# Patient Record
Sex: FEMALE | Race: WHITE | Employment: FULL TIME | URBAN - METROPOLITAN AREA
[De-identification: names, ages, dates, MRNs, and addresses within clinical notes are randomized per-mention and may not be internally consistent; named-entity substitution may affect disease eponyms.]

---

## 2018-05-03 ENCOUNTER — OFFICE VISIT (OUTPATIENT)
Dept: FAMILY MEDICINE CLINIC | Age: 48
End: 2018-05-03
Payer: COMMERCIAL

## 2018-05-03 VITALS
HEIGHT: 72 IN | OXYGEN SATURATION: 94 % | TEMPERATURE: 97.6 F | SYSTOLIC BLOOD PRESSURE: 122 MMHG | DIASTOLIC BLOOD PRESSURE: 88 MMHG | RESPIRATION RATE: 18 BRPM | WEIGHT: 293 LBS | HEART RATE: 68 BPM | BODY MASS INDEX: 39.68 KG/M2

## 2018-05-03 DIAGNOSIS — R63.5 WEIGHT GAIN: ICD-10-CM

## 2018-05-03 DIAGNOSIS — E66.01 MORBID OBESITY (HCC): Chronic | ICD-10-CM

## 2018-05-03 DIAGNOSIS — R60.9 PERIPHERAL EDEMA: ICD-10-CM

## 2018-05-03 DIAGNOSIS — I10 ESSENTIAL HYPERTENSION: ICD-10-CM

## 2018-05-03 DIAGNOSIS — E89.0 POSTOPERATIVE HYPOTHYROIDISM: Primary | ICD-10-CM

## 2018-05-03 DIAGNOSIS — R07.9 CHEST PAIN, UNSPECIFIED TYPE: ICD-10-CM

## 2018-05-03 DIAGNOSIS — E89.0 POSTSURGICAL HYPOTHYROIDISM: Chronic | ICD-10-CM

## 2018-05-03 DIAGNOSIS — R06.02 SOB (SHORTNESS OF BREATH): ICD-10-CM

## 2018-05-03 PROCEDURE — 99214 OFFICE O/P EST MOD 30 MIN: CPT | Performed by: PHYSICIAN ASSISTANT

## 2018-05-03 PROCEDURE — 36415 COLL VENOUS BLD VENIPUNCTURE: CPT | Performed by: PHYSICIAN ASSISTANT

## 2018-05-03 RX ORDER — LEVOTHYROXINE SODIUM 0.2 MG/1
200 TABLET ORAL DAILY
Qty: 30 TABLET | Refills: 1 | Status: SHIPPED | OUTPATIENT
Start: 2018-05-03 | End: 2018-10-18 | Stop reason: SDUPTHER

## 2018-05-03 RX ORDER — HYDROCHLOROTHIAZIDE 25 MG/1
25 TABLET ORAL DAILY
Qty: 30 TABLET | Refills: 1 | Status: SHIPPED | OUTPATIENT
Start: 2018-05-03 | End: 2018-10-17 | Stop reason: SDUPTHER

## 2018-05-03 RX ORDER — LEVOTHYROXINE SODIUM 0.03 MG/1
25 TABLET ORAL DAILY
Qty: 30 TABLET | Refills: 1 | Status: SHIPPED | OUTPATIENT
Start: 2018-05-03 | End: 2018-10-18 | Stop reason: SDUPTHER

## 2018-05-04 ENCOUNTER — OFFICE VISIT (OUTPATIENT)
Dept: FAMILY MEDICINE CLINIC | Age: 48
End: 2018-05-04
Payer: COMMERCIAL

## 2018-05-04 VITALS
TEMPERATURE: 98.6 F | OXYGEN SATURATION: 96 % | SYSTOLIC BLOOD PRESSURE: 170 MMHG | HEART RATE: 77 BPM | WEIGHT: 293 LBS | BODY MASS INDEX: 39.68 KG/M2 | HEIGHT: 72 IN | DIASTOLIC BLOOD PRESSURE: 98 MMHG

## 2018-05-04 DIAGNOSIS — J30.9 CHRONIC ALLERGIC RHINITIS, UNSPECIFIED SEASONALITY, UNSPECIFIED TRIGGER: ICD-10-CM

## 2018-05-04 DIAGNOSIS — R60.0 BILATERAL LOWER EXTREMITY EDEMA: ICD-10-CM

## 2018-05-04 DIAGNOSIS — D32.9 MENINGIOMA (HCC): Chronic | ICD-10-CM

## 2018-05-04 DIAGNOSIS — R06.02 SHORTNESS OF BREATH: ICD-10-CM

## 2018-05-04 DIAGNOSIS — I10 ESSENTIAL HYPERTENSION: Primary | ICD-10-CM

## 2018-05-04 PROCEDURE — 99214 OFFICE O/P EST MOD 30 MIN: CPT | Performed by: NURSE PRACTITIONER

## 2018-05-04 ASSESSMENT — PATIENT HEALTH QUESTIONNAIRE - PHQ9
SUM OF ALL RESPONSES TO PHQ QUESTIONS 1-9: 0
SUM OF ALL RESPONSES TO PHQ9 QUESTIONS 1 & 2: 0
2. FEELING DOWN, DEPRESSED OR HOPELESS: 0
1. LITTLE INTEREST OR PLEASURE IN DOING THINGS: 0

## 2018-05-04 ASSESSMENT — ENCOUNTER SYMPTOMS
SHORTNESS OF BREATH: 1
COUGH: 1
NAUSEA: 0
BLURRED VISION: 0
WHEEZING: 0
SINUS PAIN: 1
DIARRHEA: 0
VOMITING: 0
DOUBLE VISION: 0
CONSTIPATION: 0

## 2018-06-12 ENCOUNTER — TELEPHONE (OUTPATIENT)
Dept: FAMILY MEDICINE CLINIC | Age: 48
End: 2018-06-12

## 2018-07-27 DIAGNOSIS — R60.9 PERIPHERAL EDEMA: ICD-10-CM

## 2018-07-27 DIAGNOSIS — R63.5 WEIGHT GAIN: ICD-10-CM

## 2018-07-27 DIAGNOSIS — E89.0 POSTOPERATIVE HYPOTHYROIDISM: ICD-10-CM

## 2018-07-27 NOTE — TELEPHONE ENCOUNTER
Requested Prescriptions     Pending Prescriptions Disp Refills    levothyroxine (SYNTHROID) 200 MCG tablet 30 tablet 2     Sig: Take 1 tablet by mouth daily       Next appt is Visit date not found  Last appt was Visit date not found

## 2018-07-30 RX ORDER — LEVOTHYROXINE SODIUM 0.2 MG/1
200 TABLET ORAL DAILY
Qty: 30 TABLET | Refills: 2 | OUTPATIENT
Start: 2018-07-30

## 2018-07-30 NOTE — TELEPHONE ENCOUNTER
Pt last seen in 10/18. Was restarted on Synthroid at that time. Was to follow up for TSH in 5-6 weeks so is overdue. She needs visit and TSH draw for refill.

## 2018-10-17 ENCOUNTER — OFFICE VISIT (OUTPATIENT)
Dept: FAMILY MEDICINE CLINIC | Age: 48
End: 2018-10-17
Payer: COMMERCIAL

## 2018-10-17 ENCOUNTER — HOSPITAL ENCOUNTER (OUTPATIENT)
Age: 48
Discharge: HOME | End: 2018-10-19
Payer: COMMERCIAL

## 2018-10-17 ENCOUNTER — NURSE ONLY (OUTPATIENT)
Dept: FAMILY MEDICINE CLINIC | Age: 48
End: 2018-10-17
Payer: COMMERCIAL

## 2018-10-17 VITALS
WEIGHT: 293 LBS | HEIGHT: 72 IN | BODY MASS INDEX: 39.68 KG/M2 | TEMPERATURE: 98.4 F | OXYGEN SATURATION: 97 % | HEART RATE: 68 BPM | SYSTOLIC BLOOD PRESSURE: 138 MMHG | RESPIRATION RATE: 16 BRPM | DIASTOLIC BLOOD PRESSURE: 88 MMHG

## 2018-10-17 DIAGNOSIS — R06.02 SOB (SHORTNESS OF BREATH): ICD-10-CM

## 2018-10-17 DIAGNOSIS — I10 ESSENTIAL HYPERTENSION: ICD-10-CM

## 2018-10-17 DIAGNOSIS — J32.9 SINOBRONCHITIS: Primary | ICD-10-CM

## 2018-10-17 DIAGNOSIS — R06.02 SHORTNESS OF BREATH: ICD-10-CM

## 2018-10-17 DIAGNOSIS — J40 SINOBRONCHITIS: Primary | ICD-10-CM

## 2018-10-17 LAB — PRO-BNP: 75 PG/ML (ref 0–125)

## 2018-10-17 PROCEDURE — 83880 ASSAY OF NATRIURETIC PEPTIDE: CPT

## 2018-10-17 PROCEDURE — 1036F TOBACCO NON-USER: CPT | Performed by: PHYSICIAN ASSISTANT

## 2018-10-17 PROCEDURE — 99213 OFFICE O/P EST LOW 20 MIN: CPT | Performed by: PHYSICIAN ASSISTANT

## 2018-10-17 PROCEDURE — G8427 DOCREV CUR MEDS BY ELIG CLIN: HCPCS | Performed by: PHYSICIAN ASSISTANT

## 2018-10-17 PROCEDURE — 36415 COLL VENOUS BLD VENIPUNCTURE: CPT | Performed by: NURSE PRACTITIONER

## 2018-10-17 PROCEDURE — G8417 CALC BMI ABV UP PARAM F/U: HCPCS | Performed by: PHYSICIAN ASSISTANT

## 2018-10-17 PROCEDURE — G8484 FLU IMMUNIZE NO ADMIN: HCPCS | Performed by: PHYSICIAN ASSISTANT

## 2018-10-17 RX ORDER — BENZONATATE 200 MG/1
200 CAPSULE ORAL 3 TIMES DAILY PRN
Qty: 15 CAPSULE | Refills: 0 | Status: SHIPPED | OUTPATIENT
Start: 2018-10-17 | End: 2018-10-18 | Stop reason: SDUPTHER

## 2018-10-17 RX ORDER — DOXYCYCLINE HYCLATE 100 MG/1
100 CAPSULE ORAL 2 TIMES DAILY
Qty: 20 CAPSULE | Refills: 0 | Status: SHIPPED | OUTPATIENT
Start: 2018-10-17 | End: 2018-10-18 | Stop reason: SDUPTHER

## 2018-10-17 RX ORDER — HYDROCHLOROTHIAZIDE 25 MG/1
25 TABLET ORAL DAILY
Qty: 30 TABLET | Refills: 0 | Status: SHIPPED | OUTPATIENT
Start: 2018-10-17 | End: 2018-10-18 | Stop reason: SDUPTHER

## 2018-10-17 NOTE — PROGRESS NOTES
Chief Complaint:   Sinusitis (sinus pressure x 1 week); Cough (productive with green sputum); and Chest Congestion      History of Present Illness   Source of history provided by:  patient. Caren Harris is a 50 y.o. old female with a past medical history of:   Past Medical History:   Diagnosis Date    [facility] [address]     Headache     Heart beat abnormality     fluttering    Hypothyroidism     Meningioma ([facility] [address]     Obesity    Presents to the [facility] care for evaluation of sinus pressure, nasal congestion, discolored nasal drainage, bilateral ear pressure, productive cough, chest congestion, and sore throat x 1 week. Has been taking multiple OTC agents without relief. Denies any fever, chills, CP, SOB, or GI symptoms. Denies any hx of asthma, COPD, or tobacco use. ROS    Unless otherwise stated in this report or unable to obtain because of the patient's clinical or mental status as evidenced by the medical record, this patients's positive and negative responses for Review of Systems, constitutional, psych, eyes, ENT, cardiovascular, respiratory, gastrointestinal, neurological, genitourinary, musculoskeletal, integument systems and systems related to the presenting problem are either stated in the preceding or were not pertinent or were negative for the symptoms and/or complaints related to the medical problem. Past Surgical History:  has a past surgical history that includes brain surgery; Thyroidectomy; Gastric bypass surgery (2003); and Finger surgery. Social History:  reports that she has never smoked. She has never used smokeless tobacco. She reports that she does not drink alcohol or use drugs. Family History: family history includes Arthritis in her mother; Heart Disease in her father and mother; Obesity in her mother. She was adopted.    Allergies: Seasonal    Physical Exam         VS:  /88   Pulse 68   Temp 98.4 °F (36.9 °C) (Oral)   Resp 16   Ht 6' 1\" (1.854 m)   Wt (!) 378

## 2018-10-18 ENCOUNTER — TELEPHONE (OUTPATIENT)
Dept: FAMILY MEDICINE CLINIC | Age: 48
End: 2018-10-18

## 2018-10-18 DIAGNOSIS — E89.0 POSTOPERATIVE HYPOTHYROIDISM: ICD-10-CM

## 2018-10-18 DIAGNOSIS — R63.5 WEIGHT GAIN: ICD-10-CM

## 2018-10-18 DIAGNOSIS — R60.9 PERIPHERAL EDEMA: ICD-10-CM

## 2018-10-18 DIAGNOSIS — R06.02 SOB (SHORTNESS OF BREATH): ICD-10-CM

## 2018-10-18 DIAGNOSIS — I10 ESSENTIAL HYPERTENSION: ICD-10-CM

## 2018-10-18 RX ORDER — HYDROCHLOROTHIAZIDE 25 MG/1
25 TABLET ORAL DAILY
Qty: 30 TABLET | Refills: 0 | Status: SHIPPED | OUTPATIENT
Start: 2018-10-18 | End: 2019-10-21 | Stop reason: SDUPTHER

## 2018-10-18 RX ORDER — BENZONATATE 200 MG/1
200 CAPSULE ORAL 3 TIMES DAILY PRN
Qty: 15 CAPSULE | Refills: 0 | Status: SHIPPED | OUTPATIENT
Start: 2018-10-18 | End: 2019-10-11

## 2018-10-18 RX ORDER — DOXYCYCLINE HYCLATE 100 MG/1
100 CAPSULE ORAL 2 TIMES DAILY
Qty: 20 CAPSULE | Refills: 0 | Status: SHIPPED | OUTPATIENT
Start: 2018-10-18 | End: 2018-10-28

## 2018-10-19 ENCOUNTER — TELEPHONE (OUTPATIENT)
Dept: FAMILY MEDICINE CLINIC | Age: 48
End: 2018-10-19

## 2018-10-23 RX ORDER — LEVOTHYROXINE SODIUM 0.2 MG/1
200 TABLET ORAL DAILY
Qty: 30 TABLET | Refills: 0 | Status: SHIPPED | OUTPATIENT
Start: 2018-10-23 | End: 2019-10-21 | Stop reason: SDUPTHER

## 2018-10-23 RX ORDER — LEVOTHYROXINE SODIUM 0.03 MG/1
25 TABLET ORAL DAILY
Qty: 30 TABLET | Refills: 0 | Status: SHIPPED | OUTPATIENT
Start: 2018-10-23 | End: 2019-10-21 | Stop reason: SDUPTHER

## 2018-11-13 ENCOUNTER — OFFICE VISIT (OUTPATIENT)
Dept: FAMILY MEDICINE CLINIC | Age: 48
End: 2018-11-13
Payer: COMMERCIAL

## 2018-11-13 VITALS
BODY MASS INDEX: 39.68 KG/M2 | HEART RATE: 70 BPM | SYSTOLIC BLOOD PRESSURE: 124 MMHG | HEIGHT: 72 IN | TEMPERATURE: 98.3 F | DIASTOLIC BLOOD PRESSURE: 80 MMHG | WEIGHT: 293 LBS | RESPIRATION RATE: 16 BRPM | OXYGEN SATURATION: 97 %

## 2018-11-13 DIAGNOSIS — K11.20 SIALOADENITIS: Primary | ICD-10-CM

## 2018-11-13 PROCEDURE — 1036F TOBACCO NON-USER: CPT | Performed by: NURSE PRACTITIONER

## 2018-11-13 PROCEDURE — G8417 CALC BMI ABV UP PARAM F/U: HCPCS | Performed by: NURSE PRACTITIONER

## 2018-11-13 PROCEDURE — G8427 DOCREV CUR MEDS BY ELIG CLIN: HCPCS | Performed by: NURSE PRACTITIONER

## 2018-11-13 PROCEDURE — 99213 OFFICE O/P EST LOW 20 MIN: CPT | Performed by: NURSE PRACTITIONER

## 2018-11-13 PROCEDURE — 3017F COLORECTAL CA SCREEN DOC REV: CPT | Performed by: NURSE PRACTITIONER

## 2018-11-13 PROCEDURE — G8484 FLU IMMUNIZE NO ADMIN: HCPCS | Performed by: NURSE PRACTITIONER

## 2018-11-13 RX ORDER — CEPHALEXIN 500 MG/1
CAPSULE ORAL
Qty: 40 CAPSULE | Refills: 0 | Status: SHIPPED | OUTPATIENT
Start: 2018-11-13 | End: 2020-01-15 | Stop reason: ALTCHOICE

## 2018-11-13 RX ORDER — TRIAMCINOLONE ACETONIDE 40 MG/ML
60 INJECTION, SUSPENSION INTRA-ARTICULAR; INTRAMUSCULAR ONCE
Status: COMPLETED | OUTPATIENT
Start: 2018-11-13 | End: 2018-11-13

## 2018-11-13 RX ORDER — PREDNISONE 10 MG/1
TABLET ORAL
Qty: 6 TABLET | Refills: 0 | Status: SHIPPED | OUTPATIENT
Start: 2018-11-13 | End: 2020-01-15 | Stop reason: ALTCHOICE

## 2018-11-13 RX ADMIN — TRIAMCINOLONE ACETONIDE 60 MG: 40 INJECTION, SUSPENSION INTRA-ARTICULAR; INTRAMUSCULAR at 17:21

## 2018-11-13 ASSESSMENT — ENCOUNTER SYMPTOMS
SINUS PRESSURE: 0
EYE ITCHING: 0
NAUSEA: 0
APNEA: 0
VOMITING: 0
SORE THROAT: 0
PHOTOPHOBIA: 0
VOICE CHANGE: 0
CHEST TIGHTNESS: 0
SHORTNESS OF BREATH: 0
COUGH: 0
EYE DISCHARGE: 0
STRIDOR: 0
RHINORRHEA: 0
DIARRHEA: 0
EYE PAIN: 0
COLOR CHANGE: 0
TROUBLE SWALLOWING: 1
CHOKING: 0
SINUS PAIN: 0
ABDOMINAL PAIN: 0
EYE REDNESS: 0
WHEEZING: 0

## 2018-11-13 NOTE — PROGRESS NOTES
home exercises printed for patient's review and were included in patient instructions on his/her After Visit Summary and given to patient at the end of visit. Counseled regarding above diagnosis, including possible risks and complications,  especially if left uncontrolled. Counseled regarding the possible side effects, risks, benefits and alternatives to treatment; patient and/or guardian verbalizes understanding, agrees, feels comfortable with and wishes to proceed with above treatment plan. Advised patient to call with any new medication issues, and read all Rx info from pharmacy to assure aware of all possible risks and side effects of medication before taking. Reviewed age and gender appropriate health screening exams and vaccinations. Advised patient regarding importance of keeping up with recommended health maintenance and to schedule as soon as possible if overdue, as this is important in assessing for undiagnosed pathology, especially cancer, as well as protecting against potentially harmful/life threatening disease. Patient and/or guardian verbalizes understanding and agrees with above counseling, assessment and plan. All questions answered.     Natalie Yuan, APRN - CNP

## 2019-10-11 ENCOUNTER — OFFICE VISIT (OUTPATIENT)
Dept: FAMILY MEDICINE CLINIC | Age: 49
End: 2019-10-11
Payer: COMMERCIAL

## 2019-10-11 VITALS
TEMPERATURE: 98.5 F | HEIGHT: 72 IN | SYSTOLIC BLOOD PRESSURE: 138 MMHG | RESPIRATION RATE: 20 BRPM | OXYGEN SATURATION: 95 % | BODY MASS INDEX: 39.68 KG/M2 | DIASTOLIC BLOOD PRESSURE: 88 MMHG | HEART RATE: 95 BPM | WEIGHT: 293 LBS

## 2019-10-11 DIAGNOSIS — J40 SINOBRONCHITIS: Primary | ICD-10-CM

## 2019-10-11 DIAGNOSIS — J32.9 SINOBRONCHITIS: Primary | ICD-10-CM

## 2019-10-11 PROCEDURE — 99213 OFFICE O/P EST LOW 20 MIN: CPT | Performed by: PHYSICIAN ASSISTANT

## 2019-10-11 PROCEDURE — 3017F COLORECTAL CA SCREEN DOC REV: CPT | Performed by: PHYSICIAN ASSISTANT

## 2019-10-11 PROCEDURE — 1036F TOBACCO NON-USER: CPT | Performed by: PHYSICIAN ASSISTANT

## 2019-10-11 PROCEDURE — G8427 DOCREV CUR MEDS BY ELIG CLIN: HCPCS | Performed by: PHYSICIAN ASSISTANT

## 2019-10-11 PROCEDURE — G8484 FLU IMMUNIZE NO ADMIN: HCPCS | Performed by: PHYSICIAN ASSISTANT

## 2019-10-11 PROCEDURE — G8417 CALC BMI ABV UP PARAM F/U: HCPCS | Performed by: PHYSICIAN ASSISTANT

## 2019-10-11 RX ORDER — ALBUTEROL SULFATE 90 UG/1
2 AEROSOL, METERED RESPIRATORY (INHALATION)
Qty: 1 INHALER | Refills: 0 | Status: SHIPPED | OUTPATIENT
Start: 2019-10-11 | End: 2020-01-15 | Stop reason: ALTCHOICE

## 2019-10-11 RX ORDER — BENZONATATE 200 MG/1
200 CAPSULE ORAL 3 TIMES DAILY PRN
Qty: 15 CAPSULE | Refills: 0 | Status: SHIPPED | OUTPATIENT
Start: 2019-10-11 | End: 2020-01-15 | Stop reason: ALTCHOICE

## 2019-10-11 RX ORDER — DOXYCYCLINE HYCLATE 100 MG/1
100 CAPSULE ORAL 2 TIMES DAILY
Qty: 20 CAPSULE | Refills: 0 | Status: SHIPPED | OUTPATIENT
Start: 2019-10-11 | End: 2019-10-21

## 2019-10-16 ENCOUNTER — TELEPHONE (OUTPATIENT)
Dept: ADMINISTRATIVE | Age: 49
End: 2019-10-16

## 2019-10-16 NOTE — TELEPHONE ENCOUNTER
Pt called to cancel appt for today with Coy Ovalle, pt got called to work at the [employer] at 4 am this morning. Pt tried to call and cancel before office hours, she apologized for missed appt, wanted to r/s. Appointment is scheduled for 10/21 with Coy Ovalle.

## 2019-10-21 ENCOUNTER — OFFICE VISIT (OUTPATIENT)
Dept: FAMILY MEDICINE CLINIC | Age: 49
End: 2019-10-21
Payer: COMMERCIAL

## 2019-10-21 VITALS
DIASTOLIC BLOOD PRESSURE: 100 MMHG | BODY MASS INDEX: 39.68 KG/M2 | OXYGEN SATURATION: 96 % | TEMPERATURE: 97.8 F | HEIGHT: 72 IN | SYSTOLIC BLOOD PRESSURE: 140 MMHG | WEIGHT: 293 LBS | HEART RATE: 59 BPM

## 2019-10-21 DIAGNOSIS — I10 ESSENTIAL HYPERTENSION: ICD-10-CM

## 2019-10-21 DIAGNOSIS — E89.0 POSTOPERATIVE HYPOTHYROIDISM: Primary | ICD-10-CM

## 2019-10-21 PROBLEM — R06.02 SOB (SHORTNESS OF BREATH): Status: ACTIVE | Noted: 2019-10-21

## 2019-10-21 PROCEDURE — G8417 CALC BMI ABV UP PARAM F/U: HCPCS | Performed by: NURSE PRACTITIONER

## 2019-10-21 PROCEDURE — G8482 FLU IMMUNIZE ORDER/ADMIN: HCPCS | Performed by: NURSE PRACTITIONER

## 2019-10-21 PROCEDURE — G8427 DOCREV CUR MEDS BY ELIG CLIN: HCPCS | Performed by: NURSE PRACTITIONER

## 2019-10-21 PROCEDURE — 99213 OFFICE O/P EST LOW 20 MIN: CPT | Performed by: NURSE PRACTITIONER

## 2019-10-21 PROCEDURE — 1036F TOBACCO NON-USER: CPT | Performed by: NURSE PRACTITIONER

## 2019-10-21 PROCEDURE — 3017F COLORECTAL CA SCREEN DOC REV: CPT | Performed by: NURSE PRACTITIONER

## 2019-10-21 RX ORDER — LEVOTHYROXINE SODIUM 0.03 MG/1
25 TABLET ORAL DAILY
Qty: 30 TABLET | Refills: 0 | Status: SHIPPED
Start: 2019-10-21 | End: 2020-03-05 | Stop reason: SDUPTHER

## 2019-10-21 RX ORDER — HYDROCHLOROTHIAZIDE 25 MG/1
25 TABLET ORAL DAILY
Qty: 30 TABLET | Refills: 0 | Status: SHIPPED
Start: 2019-10-21 | End: 2020-03-06 | Stop reason: SDUPTHER

## 2019-10-21 RX ORDER — TRAZODONE HYDROCHLORIDE 50 MG/1
50 TABLET ORAL NIGHTLY
Qty: 30 TABLET | Refills: 0 | Status: ON HOLD | OUTPATIENT
Start: 2019-10-21 | End: 2021-02-03 | Stop reason: ALTCHOICE

## 2019-10-21 RX ORDER — LEVOTHYROXINE SODIUM 0.2 MG/1
200 TABLET ORAL DAILY
Qty: 30 TABLET | Refills: 0 | Status: SHIPPED
Start: 2019-10-21 | End: 2020-03-05 | Stop reason: SDUPTHER

## 2019-10-21 ASSESSMENT — ENCOUNTER SYMPTOMS
COUGH: 1
CHOKING: 1
SINUS PAIN: 1
CONSTIPATION: 1
ALLERGIC/IMMUNOLOGIC NEGATIVE: 1
EYES NEGATIVE: 1
SINUS PRESSURE: 1

## 2019-10-21 ASSESSMENT — PATIENT HEALTH QUESTIONNAIRE - PHQ9
SUM OF ALL RESPONSES TO PHQ QUESTIONS 1-9: 0
2. FEELING DOWN, DEPRESSED OR HOPELESS: 0
SUM OF ALL RESPONSES TO PHQ QUESTIONS 1-9: 0
SUM OF ALL RESPONSES TO PHQ9 QUESTIONS 1 & 2: 0
1. LITTLE INTEREST OR PLEASURE IN DOING THINGS: 0

## 2019-10-22 PROCEDURE — 90686 IIV4 VACC NO PRSV 0.5 ML IM: CPT | Performed by: NURSE PRACTITIONER

## 2019-10-22 PROCEDURE — 90471 IMMUNIZATION ADMIN: CPT | Performed by: NURSE PRACTITIONER

## 2020-01-15 ENCOUNTER — OFFICE VISIT (OUTPATIENT)
Dept: FAMILY MEDICINE CLINIC | Age: 50
End: 2020-01-15
Payer: COMMERCIAL

## 2020-01-15 VITALS
WEIGHT: 293 LBS | HEART RATE: 70 BPM | RESPIRATION RATE: 24 BRPM | SYSTOLIC BLOOD PRESSURE: 134 MMHG | BODY MASS INDEX: 39.68 KG/M2 | DIASTOLIC BLOOD PRESSURE: 88 MMHG | OXYGEN SATURATION: 97 % | TEMPERATURE: 98.3 F | HEIGHT: 72 IN

## 2020-01-15 PROCEDURE — 3017F COLORECTAL CA SCREEN DOC REV: CPT | Performed by: NURSE PRACTITIONER

## 2020-01-15 PROCEDURE — 1036F TOBACCO NON-USER: CPT | Performed by: NURSE PRACTITIONER

## 2020-01-15 PROCEDURE — G8482 FLU IMMUNIZE ORDER/ADMIN: HCPCS | Performed by: NURSE PRACTITIONER

## 2020-01-15 PROCEDURE — 99213 OFFICE O/P EST LOW 20 MIN: CPT | Performed by: NURSE PRACTITIONER

## 2020-01-15 PROCEDURE — G8427 DOCREV CUR MEDS BY ELIG CLIN: HCPCS | Performed by: NURSE PRACTITIONER

## 2020-01-15 PROCEDURE — G8417 CALC BMI ABV UP PARAM F/U: HCPCS | Performed by: NURSE PRACTITIONER

## 2020-01-15 RX ORDER — HYDROCODONE BITARTRATE AND ACETAMINOPHEN 5; 325 MG/1; MG/1
1 TABLET ORAL EVERY 6 HOURS PRN
Qty: 8 TABLET | Refills: 0 | Status: SHIPPED | OUTPATIENT
Start: 2020-01-15 | End: 2020-01-17

## 2020-01-15 RX ORDER — CEPHALEXIN 500 MG/1
500 CAPSULE ORAL 4 TIMES DAILY
Qty: 28 CAPSULE | Refills: 0 | Status: SHIPPED | OUTPATIENT
Start: 2020-01-15 | End: 2020-01-22

## 2020-01-15 ASSESSMENT — ENCOUNTER SYMPTOMS
GASTROINTESTINAL NEGATIVE: 1
RESPIRATORY NEGATIVE: 1
EYES NEGATIVE: 1
ALLERGIC/IMMUNOLOGIC NEGATIVE: 1

## 2020-01-15 NOTE — PROGRESS NOTES
Jennifer Vergara  is a 44 y.o. female  who presents today for    Chief Complaint   Patient presents with    Foot Injury     right foot 4th toe, ripped toenail, keeps bleeding       HPI:  Pt presents to walk in with injury to right 4th distal toe with partial nail avulsion. States last night a gallon water jug fell off the counter landing directly onto her toe. Had immediate pain and swelling with injury to nail plate. Applied ice and soaking in Epsom Salts. No relief with ibuprofen tylenol. [address]:  Patient's past medical, surgical, social and/or family history reviewed, updated in chart, and are non-contributory (unless otherwise stated). Medications and allergies also reviewed and updated in chart. Review of Systems   Constitutional: Negative. HENT: Negative. Eyes: Negative. Respiratory: Negative. Cardiovascular: Negative. Gastrointestinal: Negative. Endocrine: Negative. Genitourinary: Negative. Musculoskeletal: Negative. Pain 4th digit right foot   Skin: Negative. Allergic/Immunologic: Negative. Hematological: Negative. Physical Exam:  /88   Pulse 70   Temp 98.3 °F (36.8 °C) (Oral)   Resp 24   Ht 6' 1\" (1.854 m)   Wt (!) 362 lb (164.2 kg)   SpO2 97%   BMI 47.76 kg/m²   Wt Readings from Last 3 Encounters:   01/15/20 (!) 362 lb (164.2 kg)   10/21/19 (!) 362 lb (164.2 kg)   10/11/19 (!) 364 lb (165.1 kg)         Physical Exam  Constitutional:       Appearance: She is well-developed. HENT:      Head: Normocephalic. Right Ear: External ear normal.      Left Ear: External ear normal.      Nose: Nose normal.   Eyes:      Conjunctiva/sclera: Conjunctivae normal.      Pupils: Pupils are equal, round, and reactive to light. Neck:      Musculoskeletal: Normal range of motion. Cardiovascular:      Rate and Rhythm: Normal rate and regular rhythm. Heart sounds: Normal heart sounds.    Pulmonary:      Effort: Pulmonary effort is normal. Advised patient to call with any new medication issues, and read all Rx info from pharmacy to assure aware of all possible risks and side effects of medication before taking. Reviewed age and gender appropriate health screening exams and vaccinations. Advised patient regarding importance of keeping up with recommended health maintenance and to schedule as soon as possible if overdue, as this is important in assessing for undiagnosed pathology, especially cancer, as well as protecting against potentially harmful/life threatening disease. Patient and/or guardian verbalizes understanding and agrees with above counseling, assessment and plan. All questions answered.     Nori Yeboah - CNP

## 2020-03-04 ENCOUNTER — NURSE ONLY (OUTPATIENT)
Dept: FAMILY MEDICINE CLINIC | Age: 50
End: 2020-03-04

## 2020-03-04 ENCOUNTER — HOSPITAL ENCOUNTER (OUTPATIENT)
Age: 50
Discharge: HOME | End: 2020-03-06
Payer: COMMERCIAL

## 2020-03-04 LAB
ALBUMIN SERPL-MCNC: 4 G/DL (ref 3.5–5.2)
ALP BLD-CCNC: 77 U/L (ref 35–104)
ALT SERPL-CCNC: 19 U/L (ref 0–32)
ANION GAP SERPL CALCULATED.3IONS-SCNC: 15 MMOL/L (ref 7–16)
AST SERPL-CCNC: 19 U/L (ref 0–31)
BILIRUB SERPL-MCNC: 0.7 MG/DL (ref 0–1.2)
BUN BLDV-MCNC: 15 MG/DL (ref 6–20)
CALCIUM SERPL-MCNC: 9.5 MG/DL (ref 8.6–10.2)
CHLORIDE BLD-SCNC: 103 MMOL/L (ref 98–107)
CHOLESTEROL, TOTAL: 207 MG/DL (ref 0–199)
CO2: 24 MMOL/L (ref 22–29)
CREAT SERPL-MCNC: 0.8 MG/DL (ref 0.5–1)
GFR AFRICAN AMERICAN: >60
GFR NON-AFRICAN AMERICAN: >60 ML/MIN/1.73
GLUCOSE BLD-MCNC: 115 MG/DL (ref 74–99)
HDLC SERPL-MCNC: 70 MG/DL
LDL CHOLESTEROL CALCULATED: 113 MG/DL (ref 0–99)
POTASSIUM SERPL-SCNC: 4.8 MMOL/L (ref 3.5–5)
SODIUM BLD-SCNC: 142 MMOL/L (ref 132–146)
TOTAL PROTEIN: 7.1 G/DL (ref 6.4–8.3)
TRIGL SERPL-MCNC: 118 MG/DL (ref 0–149)
TSH SERPL DL<=0.05 MIU/L-ACNC: 18.89 UIU/ML (ref 0.27–4.2)
VLDLC SERPL CALC-MCNC: 24 MG/DL

## 2020-03-04 PROCEDURE — 80061 LIPID PANEL: CPT

## 2020-03-04 PROCEDURE — 80053 COMPREHEN METABOLIC PANEL: CPT

## 2020-03-04 PROCEDURE — 84443 ASSAY THYROID STIM HORMONE: CPT

## 2020-03-04 RX ORDER — LEVOTHYROXINE SODIUM 0.03 MG/1
25 TABLET ORAL DAILY
Qty: 30 TABLET | Refills: 0 | Status: CANCELLED | OUTPATIENT
Start: 2020-03-04

## 2020-03-04 RX ORDER — LEVOTHYROXINE SODIUM 0.2 MG/1
200 TABLET ORAL DAILY
Qty: 30 TABLET | Refills: 0 | Status: CANCELLED | OUTPATIENT
Start: 2020-03-04

## 2020-03-05 RX ORDER — LEVOTHYROXINE SODIUM 0.2 MG/1
200 TABLET ORAL DAILY
Qty: 30 TABLET | Refills: 0 | Status: SHIPPED
Start: 2020-03-05 | End: 2020-03-06

## 2020-03-05 RX ORDER — LEVOTHYROXINE SODIUM 0.03 MG/1
25 TABLET ORAL DAILY
Qty: 30 TABLET | Refills: 0 | Status: SHIPPED
Start: 2020-03-05 | End: 2020-03-06

## 2020-03-06 ENCOUNTER — OFFICE VISIT (OUTPATIENT)
Dept: FAMILY MEDICINE CLINIC | Age: 50
End: 2020-03-06
Payer: COMMERCIAL

## 2020-03-06 VITALS
HEIGHT: 72 IN | SYSTOLIC BLOOD PRESSURE: 128 MMHG | DIASTOLIC BLOOD PRESSURE: 86 MMHG | WEIGHT: 293 LBS | OXYGEN SATURATION: 96 % | TEMPERATURE: 98.1 F | HEART RATE: 77 BPM | BODY MASS INDEX: 39.68 KG/M2

## 2020-03-06 PROCEDURE — 99204 OFFICE O/P NEW MOD 45 MIN: CPT | Performed by: FAMILY MEDICINE

## 2020-03-06 PROCEDURE — G8427 DOCREV CUR MEDS BY ELIG CLIN: HCPCS | Performed by: FAMILY MEDICINE

## 2020-03-06 PROCEDURE — G8417 CALC BMI ABV UP PARAM F/U: HCPCS | Performed by: FAMILY MEDICINE

## 2020-03-06 PROCEDURE — G8482 FLU IMMUNIZE ORDER/ADMIN: HCPCS | Performed by: FAMILY MEDICINE

## 2020-03-06 PROCEDURE — 1036F TOBACCO NON-USER: CPT | Performed by: FAMILY MEDICINE

## 2020-03-06 PROCEDURE — 3017F COLORECTAL CA SCREEN DOC REV: CPT | Performed by: FAMILY MEDICINE

## 2020-03-06 RX ORDER — HYDROCHLOROTHIAZIDE 25 MG/1
25 TABLET ORAL DAILY
Qty: 30 TABLET | Refills: 5 | Status: SHIPPED
Start: 2020-03-06 | End: 2021-07-26 | Stop reason: ALTCHOICE

## 2020-03-06 RX ORDER — LEVOTHYROXINE SODIUM 0.03 MG/1
25 TABLET ORAL DAILY
Qty: 30 TABLET | Refills: 0 | Status: CANCELLED | OUTPATIENT
Start: 2020-03-06

## 2020-03-06 RX ORDER — LEVOTHYROXINE SODIUM 25 MCG
25 TABLET ORAL DAILY
Qty: 30 TABLET | Refills: 5 | Status: SHIPPED
Start: 2020-03-06 | End: 2020-12-29 | Stop reason: SDUPTHER

## 2020-03-06 RX ORDER — LEVOTHYROXINE SODIUM 200 MCG
200 TABLET ORAL DAILY
Qty: 30 TABLET | Refills: 5 | Status: SHIPPED
Start: 2020-03-06 | End: 2020-12-29 | Stop reason: SDUPTHER

## 2020-03-06 RX ORDER — LEVOTHYROXINE SODIUM 0.2 MG/1
200 TABLET ORAL DAILY
Qty: 30 TABLET | Refills: 0 | Status: CANCELLED | OUTPATIENT
Start: 2020-03-06

## 2020-03-06 ASSESSMENT — ENCOUNTER SYMPTOMS
COUGH: 0
SORE THROAT: 0
NAUSEA: 0
ABDOMINAL PAIN: 0
WHEEZING: 0
VOMITING: 0
SHORTNESS OF BREATH: 0
BACK PAIN: 0
DIARRHEA: 0
BLOOD IN STOOL: 0

## 2020-03-06 ASSESSMENT — PATIENT HEALTH QUESTIONNAIRE - PHQ9
2. FEELING DOWN, DEPRESSED OR HOPELESS: 1
SUM OF ALL RESPONSES TO PHQ QUESTIONS 1-9: 2
SUM OF ALL RESPONSES TO PHQ9 QUESTIONS 1 & 2: 2
1. LITTLE INTEREST OR PLEASURE IN DOING THINGS: 1
SUM OF ALL RESPONSES TO PHQ QUESTIONS 1-9: 2

## 2020-03-06 NOTE — PROGRESS NOTES
protecting against potentially harmful/life threatening disease. Patient and/or guardian verbalizes understandingand agrees with above counseling, assessment and plan. All questions answered.     Ney Adams MD

## 2020-03-07 ASSESSMENT — ENCOUNTER SYMPTOMS: CONSTIPATION: 1

## 2020-07-23 ENCOUNTER — HOSPITAL ENCOUNTER (EMERGENCY)
Age: 50
Discharge: HOME | End: 2020-07-23
Payer: COMMERCIAL

## 2020-07-23 VITALS
SYSTOLIC BLOOD PRESSURE: 162 MMHG | DIASTOLIC BLOOD PRESSURE: 104 MMHG | BODY MASS INDEX: 48.68 KG/M2 | OXYGEN SATURATION: 95 % | TEMPERATURE: 97.6 F | RESPIRATION RATE: 16 BRPM | HEART RATE: 79 BPM | WEIGHT: 293 LBS

## 2020-07-23 PROCEDURE — 99212 OFFICE O/P EST SF 10 MIN: CPT

## 2020-07-23 PROCEDURE — 6360000002 HC RX W HCPCS: Performed by: PHYSICIAN ASSISTANT

## 2020-07-23 RX ORDER — AMOXICILLIN 875 MG/1
875 TABLET, COATED ORAL 2 TIMES DAILY
Qty: 20 TABLET | Refills: 0 | Status: SHIPPED | OUTPATIENT
Start: 2020-07-23 | End: 2020-08-02

## 2020-07-23 RX ORDER — DEXAMETHASONE SODIUM PHOSPHATE 10 MG/ML
10 INJECTION, SOLUTION INTRAMUSCULAR; INTRAVENOUS ONCE
Status: COMPLETED | OUTPATIENT
Start: 2020-07-23 | End: 2020-07-23

## 2020-07-23 RX ADMIN — DEXAMETHASONE SODIUM PHOSPHATE 10 MG: 10 INJECTION, SOLUTION INTRAMUSCULAR; INTRAVENOUS at 17:59

## 2020-07-23 NOTE — ED PROVIDER NOTES
This is a 57-year-old female that presents to urgent care complaining of a right-sided sore throat and sore mouth for the past day or so. Does have a history of strep throat in the past.  Complains of some right side anterior neck discomfort as well but no difficulty breathing or swallowing. No abdominal pain nausea vomiting diarrhea or urinary symptoms on first contact patient she appears to be in no acute distress. Review of Systems   Constitutional:        Pertinent positives and negatives are stated within HPI, all other systems reviewed and are negative. Physical Exam  Vitals signs and nursing note reviewed. Constitutional:       Appearance: She is well-developed. HENT:      Head: Normocephalic and atraumatic. Jaw: There is normal jaw occlusion. No trismus. Right Ear: Hearing, tympanic membrane, ear canal and external ear normal.      Left Ear: Hearing, tympanic membrane, ear canal and external ear normal.      Nose: Nose normal.      Right Sinus: No maxillary sinus tenderness or frontal sinus tenderness. Left Sinus: No maxillary sinus tenderness or frontal sinus tenderness. Mouth/Throat:      Dentition: Normal dentition. No dental abscesses. Pharynx: Uvula midline. Pharyngeal swelling, oropharyngeal exudate and posterior oropharyngeal erythema present. No uvula swelling. Tonsils: No tonsillar abscesses. Comments: Oropharynx is patent. Right oropharynx did see a small exudate. There  Eyes:      General: Lids are normal.      Conjunctiva/sclera: Conjunctivae normal.      Pupils: Pupils are equal, round, and reactive to light. Neck:      Musculoskeletal: Full passive range of motion without pain, normal range of motion and neck supple. No edema, erythema, crepitus, pain with movement or spinous process tenderness. Cardiovascular:      Rate and Rhythm: Normal rate and regular rhythm. Heart sounds: Normal heart sounds. No murmur.    Pulmonary: Effort: Pulmonary effort is normal.      Breath sounds: Normal breath sounds. Abdominal:      General: Bowel sounds are normal.      Palpations: Abdomen is soft. Abdomen is not rigid. Tenderness: There is no abdominal tenderness. There is no guarding or rebound. Lymphadenopathy:      Comments: No swollen lymph nodes palpated but does have some tender right-sided lymph nodes anteriorly. Skin:     General: Skin is warm and dry. Findings: No abrasion or rash. Neurological:      Mental Status: She is alert and oriented to person, place, and time. GCS: GCS eye subscore is 4. GCS verbal subscore is 5. GCS motor subscore is 6. Cranial Nerves: No cranial nerve deficit. Sensory: No sensory deficit. Coordination: Coordination normal.      Gait: Gait normal.         Procedures    MDM       --------------------------------------------- PAST HISTORY ---------------------------------------------  Past Medical History:  has a past medical history of Brain tumor ([facility] [address] Headache, Heart beat abnormality, Hypothyroidism, Meningioma ([facility] [address] and Obesity. Past Surgical History:  has a past surgical history that includes brain surgery; Thyroidectomy; Gastric bypass surgery (2003); and Finger surgery. Social History:  reports that she has never smoked. She has never used smokeless tobacco. She reports that she does not drink alcohol or use drugs. Family History: family history includes Arthritis in her mother; Heart Disease in her father and mother; Obesity in her mother. She was adopted. The patients home medications have been reviewed. Allergies: Seasonal    -------------------------------------------------- RESULTS -------------------------------------------------  No results found for this visit on 07/23/20.   No orders to display       ------------------------- NURSING NOTES AND VITALS REVIEWED ---------------------------   The nursing notes within the ED encounter and vital signs as below have been reviewed. BP (!) 162/104   Pulse 79   Temp 97.6 °F (36.4 °C) (Infrared)   Resp 16   Wt (!) 369 lb (167.4 kg)   SpO2 95%   BMI 48.68 kg/m²   Oxygen Saturation Interpretation: Normal      ------------------------------------------ PROGRESS NOTES ------------------------------------------   I have spoken with the patient and discussed todays results, in addition to providing specific details for the plan of care and counseling regarding the diagnosis and prognosis. Their questions are answered at this time and they are agreeable with the plan.      --------------------------------- ADDITIONAL PROVIDER NOTES ---------------------------------     This patient is stable for discharge. I have shared the specific conditions for return, as well as the importance of follow-up. * NOTE: This report was transcribed using voice recognition software. Every effort was made to ensure accuracy; however, inadvertent computerized transcription errors may be present.    --------------------------------- IMPRESSION AND DISPOSITION ---------------------------------    IMPRESSION  1.  Exudative pharyngitis        DISPOSITION  Disposition: Discharge to home  Patient condition is good         Almaz Pike PA-C  07/23/20 [id number]

## 2020-07-24 ENCOUNTER — CARE COORDINATION (OUTPATIENT)
Dept: CARE COORDINATION | Age: 50
End: 2020-07-24

## 2020-07-25 NOTE — CARE COORDINATION
LSW made a final call attempt to Payton Porter for the COVID-19 follow up calls. There was no answer;  Final call VM left  Episode was resolved and LSW removed from the care team.

## 2020-09-24 ENCOUNTER — TELEPHONE (OUTPATIENT)
Dept: FAMILY MEDICINE CLINIC | Age: 50
End: 2020-09-24

## 2020-09-28 ENCOUNTER — TELEPHONE (OUTPATIENT)
Dept: FAMILY MEDICINE CLINIC | Age: 50
End: 2020-09-28

## 2020-09-28 NOTE — TELEPHONE ENCOUNTER
Patient insurance has denied both synthroid mediations. A peer to peer can be made or the medication needs to be changed to the levothyroxine for insurance to pay for the medications.

## 2020-09-28 NOTE — TELEPHONE ENCOUNTER
Chart reviewed. She is overdue for a visit with labs drawn before.     Recommended scheduling visit (either in person or virtual) with labs done at least 1-2 days before to review labs so that appropriate refill dosing cane be provided

## 2020-10-02 ENCOUNTER — HOSPITAL ENCOUNTER (OUTPATIENT)
Age: 50
Discharge: HOME | End: 2020-10-04
Payer: COMMERCIAL

## 2020-10-02 LAB
ALBUMIN SERPL-MCNC: 4 G/DL (ref 3.5–5.2)
ALP BLD-CCNC: 83 U/L (ref 35–104)
ALT SERPL-CCNC: 24 U/L (ref 0–32)
ANION GAP SERPL CALCULATED.3IONS-SCNC: 14 MMOL/L (ref 7–16)
AST SERPL-CCNC: 24 U/L (ref 0–31)
BILIRUB SERPL-MCNC: 1 MG/DL (ref 0–1.2)
BUN BLDV-MCNC: 16 MG/DL (ref 6–20)
CALCIUM SERPL-MCNC: 9.5 MG/DL (ref 8.6–10.2)
CHLORIDE BLD-SCNC: 100 MMOL/L (ref 98–107)
CHOLESTEROL, TOTAL: 175 MG/DL (ref 0–199)
CO2: 26 MMOL/L (ref 22–29)
CREAT SERPL-MCNC: 0.7 MG/DL (ref 0.5–1)
GFR AFRICAN AMERICAN: >60
GFR NON-AFRICAN AMERICAN: >60 ML/MIN/1.73
GLUCOSE BLD-MCNC: 107 MG/DL (ref 74–99)
HBA1C MFR BLD: 5.9 % (ref 4–5.6)
HDLC SERPL-MCNC: 65 MG/DL
LDL CHOLESTEROL CALCULATED: 83 MG/DL (ref 0–99)
POTASSIUM SERPL-SCNC: 3.9 MMOL/L (ref 3.5–5)
SODIUM BLD-SCNC: 140 MMOL/L (ref 132–146)
T4 TOTAL: 5.1 MCG/DL (ref 4.5–11.7)
TOTAL PROTEIN: 7.1 G/DL (ref 6.4–8.3)
TRIGL SERPL-MCNC: 133 MG/DL (ref 0–149)
TSH SERPL DL<=0.05 MIU/L-ACNC: 10.09 UIU/ML (ref 0.27–4.2)
VLDLC SERPL CALC-MCNC: 27 MG/DL

## 2020-10-02 PROCEDURE — 84436 ASSAY OF TOTAL THYROXINE: CPT

## 2020-10-02 PROCEDURE — 84443 ASSAY THYROID STIM HORMONE: CPT

## 2020-10-02 PROCEDURE — 80053 COMPREHEN METABOLIC PANEL: CPT

## 2020-10-02 PROCEDURE — 36415 COLL VENOUS BLD VENIPUNCTURE: CPT

## 2020-10-02 PROCEDURE — 80061 LIPID PANEL: CPT

## 2020-10-02 PROCEDURE — 83036 HEMOGLOBIN GLYCOSYLATED A1C: CPT

## 2020-10-30 ENCOUNTER — HOSPITAL ENCOUNTER (OUTPATIENT)
Age: 50
Setting detail: SPECIMEN
Discharge: HOME | End: 2020-10-30
Payer: COMMERCIAL

## 2020-12-28 RX ORDER — LEVOTHYROXINE SODIUM 25 MCG
25 TABLET ORAL DAILY
Qty: 30 TABLET | Refills: 5 | OUTPATIENT
Start: 2020-12-28

## 2020-12-28 RX ORDER — LEVOTHYROXINE SODIUM 200 MCG
200 TABLET ORAL DAILY
Qty: 30 TABLET | Refills: 5 | OUTPATIENT
Start: 2020-12-28

## 2020-12-28 RX ORDER — HYDROCHLOROTHIAZIDE 25 MG/1
25 TABLET ORAL DAILY
Qty: 30 TABLET | Refills: 5 | OUTPATIENT
Start: 2020-12-28

## 2020-12-29 RX ORDER — LEVOTHYROXINE SODIUM 200 MCG
200 TABLET ORAL DAILY
Qty: 7 TABLET | Refills: 0 | Status: CANCELLED | OUTPATIENT
Start: 2020-12-29

## 2020-12-29 RX ORDER — LEVOTHYROXINE SODIUM 200 MCG
200 TABLET ORAL DAILY
Qty: 7 TABLET | Refills: 0 | Status: SHIPPED
Start: 2020-12-29 | End: 2021-07-26

## 2020-12-29 RX ORDER — LEVOTHYROXINE SODIUM 25 MCG
25 TABLET ORAL DAILY
Qty: 7 TABLET | Refills: 0 | Status: CANCELLED | OUTPATIENT
Start: 2020-12-29

## 2020-12-29 RX ORDER — LEVOTHYROXINE SODIUM 25 MCG
25 TABLET ORAL DAILY
Qty: 7 TABLET | Refills: 0 | Status: SHIPPED
Start: 2020-12-29 | End: 2021-07-26

## 2020-12-29 RX ORDER — HYDROCHLOROTHIAZIDE 25 MG/1
25 TABLET ORAL DAILY
Qty: 7 TABLET | Refills: 0 | OUTPATIENT
Start: 2020-12-29

## 2020-12-29 NOTE — TELEPHONE ENCOUNTER
Patient called and was informed needs appt. VV made 1/5/21. Patient stated she is out of RX. Informed will send msg for short term refill, till patient has appt.       Last seen Visit date not found  Next appt 1/5/2021  Zeenat

## 2020-12-29 NOTE — TELEPHONE ENCOUNTER
Please notify patient that a 7 day supply was sent to [facility].     She should also have labs drawn prior to visit

## 2020-12-31 NOTE — PROGRESS NOTES
21    TELEHEALTH EVALUATION -- Audio/Visual (During GXEFI-87 public health emergency)    Milind Padilla is a 44 y.o. female being evaluated by a Virtual Visit (video visit) encounter to address concerns as mentioned above. A caregiver was present when appropriate. Due to this being a TeleHealth encounter (During PRGLX-50 public health emergency), evaluation of the following organ systems was limited: Vitals/Constitutional/EENT/Resp/CV/GI//MS/Neuro/Skin/Heme-Lymph-Imm. Pursuant to the emergency declaration under the [address] [place] [address], [address] [place] [address] authority and the [employer] Act, this Virtual Visit was conducted with patient's (and/or legal guardian's) consent, to reduce the patient's risk of exposure to COVID-19 and provide necessary medical care. The patient (and/or legal guardian) has also been advised to contact this office for worsening conditions or problems, and seek emergency medical treatment and/or call 911 if deemed necessary. Services were provided through a video synchronous discussion virtually to substitute for in-person clinic visit. Patient's location: home address in [facility]. Physician location: other address in [place].       Patient identification was verified at the start of the visit: Yes    HPI:    Milind Padilla (:  1970) has requested an audio/video evaluation for the following concern(s):    Chief Complaint   Patient presents with    Medication Refill       Hypothyroidism: Patient is here today to follow up chronic hypothyroidism. This is not generally controlled on current medication regimen (generic Sythroid 225 mcg/day) She admits to frequent noncompliance with Synthroid dosing. Symptoms from thyroid standpoint include paresthesias of skin, dry skin, dry hair, poor sleep, cold intolerance, weight gain and difficulty losing weight, fatigue, constipation. Most recent labs reviewed with patient and are remarkable. Thyroid function in particular is not controlled. Thyroid ultrasound has been done in the past and is  remarkable. Thyroid antibodies have not been done in the past and are not remarkable. Patient does have exposure to radiation and/or iodine in the past.    Lab Results   Component Value Date    TSH 10.090 (H) 10/02/2020     Compliance is improving; needs updated labs    Hypertension:  Patient is here for follow up chronic hypertension. This is  generally controlled on current medication regimen. Fair compliance with HCTZ 25 mg  Most recent labs reviewed with patient and are not remarkable. No symptoms from htn standpoint per ROS. Patient is not compliant with lifestyle modifications. Patient does not smoke. Comorbid conditions include hypothyroidism and mild hyperlipidemia.    __________________________________________________________________________________________    Postoperative hypothyroidism: Patient reports difficulty with regular compliance  -     SYNTHROID 25 MCG tablet; Take 1 tablet by mouth Daily  -     SYNTHROID 200 MCG tablet; Take 1 tablet by mouth Daily  -     Discussed strategies to improve compliance  -     Lipid Panel; Future  -     T4; Future  -     TSH without Reflex; Future    Essential hypertension  -     hydroCHLOROthiazide (HYDRODIURIL) 25 MG tablet; Take 1 tablet by mouth daily  -     Comprehensive Metabolic Panel; Future  -     Lipid Panel; Future  -     T4; Future  -     TSH without Reflex;  Future    Hyperglycemia -     Comprehensive Metabolic Panel; Future  -     Hemoglobin A1C; Future        PMFSH:  Patient's past medical, surgical, social and/or family history reviewed, updated in chart, and are non-contributory (unless otherwise stated). Medications and allergies also reviewed and updated in chart. Review of Systems  Review of Systems   Constitutional: Positive for fatigue and unexpected weight change. HENT: Negative for congestion, ear pain and sore throat. Respiratory: Negative for cough, shortness of breath and wheezing. Cardiovascular: Positive for leg swelling. Negative for chest pain and palpitations. Gastrointestinal: Positive for constipation. Negative for abdominal pain, blood in stool, diarrhea, nausea and vomiting. Endocrine: Positive for cold intolerance. Genitourinary: Negative for dysuria, frequency, hematuria and urgency. Musculoskeletal: Positive for arthralgias and myalgias. Negative for back pain and neck pain. Skin: Negative for rash. Dry skin and dry hair   Neurological: Negative for dizziness, weakness and headaches. Psychiatric/Behavioral: Positive for dysphoric mood and sleep disturbance. The patient is not nervous/anxious.             Allergies   Allergen Reactions    Seasonal    ,   Past Medical History:   Diagnosis Date    Brain tumor ([address]     Headache     Heart beat abnormality     fluttering    Hypothyroidism     Meningioma (HCC)     Obesity    ,   Past Surgical History:   Procedure Laterality Date    BRAIN SURGERY      FINGER SURGERY      GASTRIC BYPASS SURGERY  2003    THYROIDECTOMY     ,   Social History     Tobacco Use    Smoking status: Never Smoker    Smokeless tobacco: Never Used   Substance Use Topics    Alcohol use: No     Comment: social    Drug use: No   ,   Family History   Adopted: Yes   Problem Relation Age of Onset    Heart Disease Mother     Obesity Mother     Arthritis Mother     Heart Disease Father    , Immunization History   Administered Date(s) Administered    Influenza Virus Vaccine 10/18/2016    Influenza, [place], IM, PF (6 mo and older Fluzone, Flulaval, Fluarix, and 3 yrs and older Afluria) 10/22/2019    Tdap (Boostrix, Adacel) 03/27/2015   ,   Health Maintenance   Topic Date Due    Hepatitis C screen  1970    HIV screen  10/31/1983    Cervical cancer screen  10/31/1989    Breast cancer screen  10/31/2018    Shingles Vaccine (1 of 2) 10/31/2018    Colon cancer screen colonoscopy  10/31/2018    Flu vaccine (1) 09/01/2020    A1C test (Diabetic or Prediabetic)  10/02/2021    TSH testing  10/02/2021    Potassium monitoring  10/02/2021    Creatinine monitoring  10/02/2021    DTaP/Tdap/Td vaccine (2 - Td) 03/27/2025    Lipid screen  10/02/2025    Hepatitis A vaccine  Aged Out    Hepatitis B vaccine  Aged Out    Hib vaccine  Aged Out    Meningococcal (ACWY) vaccine  Aged Out    Pneumococcal 0-64 years Vaccine  Aged Out       PHYSICAL EXAMINATION:    Patient-Reported Vitals 1/5/2021   Patient-Reported Weight 374lb   Patient-Reported Height 6 foot . [id number]        Physical Exam  Constitutional:       General: She is not in acute distress. Appearance: She is well-developed. She is not diaphoretic. HENT:      Head: Normocephalic and atraumatic. Right Ear: External ear normal.      Left Ear: External ear normal.      Mouth/Throat:      Pharynx: No oropharyngeal exudate. Eyes:      General: No scleral icterus. Right eye: No discharge. Conjunctiva/sclera: Conjunctivae normal.      Pupils: Pupils are equal, round, and reactive to light. Neck:      Musculoskeletal: Normal range of motion and neck supple. Thyroid: No thyromegaly. Cardiovascular:      Rate and Rhythm: Normal rate and regular rhythm. Heart sounds: Normal heart sounds. No murmur. Pulmonary:      Effort: Pulmonary effort is normal. No respiratory distress. Breath sounds: No stridor. No wheezing or rales. Chest:      Chest wall: No tenderness. Abdominal:      General: Bowel sounds are normal. There is no distension. Palpations: Abdomen is soft. There is no mass. Tenderness: There is no abdominal tenderness. There is no guarding. Musculoskeletal: Normal range of motion. General: No tenderness. Lymphadenopathy:      Cervical: No cervical adenopathy. Skin:     General: Skin is warm and dry. Coloration: Skin is not pale. Findings: No erythema or rash. Neurological:      Mental Status: She is alert and oriented to person, place, and time. Psychiatric:         Behavior: Behavior normal.         Thought Content: Thought content normal.            Assessment / Plan:      Clemente Bynum was seen today for medication refill. Diagnoses and all orders for this visit:    Postoperative hypothyroidism  -     SYNTHROID 25 MCG tablet; Take 1 tablet by mouth Daily  -     SYNTHROID 200 MCG tablet; Take 1 tablet by mouth Daily  -     TSH without Reflex; Future  -     T4; Future    Essential hypertension  -     triamterene-hydroCHLOROthiazide (MAXZIDE) 75-50 MG per tablet; Take 1 tablet by mouth daily  -     Comprehensive Metabolic Panel; Future  -     Lipid Panel; Future    Encounter for screening mammogram for malignant neoplasm of breast  -     [facility] KHALIF DIGITAL SCREEN BILATERAL; Future    Colon cancer screening  -     [facility]    Hyperglycemia  -     Hemoglobin A1C; Future        Follow Up:  Return for F/U 6 weeks (2/21) check up; fasting labs 1 week prior. or sooner if necessary. Call or go to ED immediately if symptoms worsen or persist.    Educational materials and/or home exercises printed for patient's review and were included in patient instructions on his/her AfterVisit Summary and given to patient at the end of visit. Counseled regarding above diagnosis,including possible risks and complications,  especially if left uncontrolled. Counseled regarding the possible side effects, risks, benefits and alternatives to treatment; patient and/or guardian verbalizes understanding, agrees, feels comfortable with and wishes to proceed with above treatment plan. Advised patient tocall with any new medication issues, and read all Rx info from pharmacy to assureaware of all possible risks and side effects of medication before taking. Reviewed age and gender appropriate health screening exams and vaccinations. Advisedpatient regarding importance of keeping up with recommended health maintenance andto schedule as soon as possible if overdue, as this is important in assessing forundiagnosed pathology, especially cancer, as well as protecting against potentially harmful/life threatening disease. Patient and/or guardian verbalizes understandingand agrees with above counseling, assessment and plan. All questions answered. Total time spent for this encounter: Not billed by time      --Magalie Landa MD on 12/31/2020 at 10:42 AM    An electronic signature was used to authenticate this note.

## 2021-01-05 ENCOUNTER — VIRTUAL VISIT (OUTPATIENT)
Dept: FAMILY MEDICINE CLINIC | Age: 51
End: 2021-01-05
Payer: COMMERCIAL

## 2021-01-05 DIAGNOSIS — Z12.31 ENCOUNTER FOR SCREENING MAMMOGRAM FOR MALIGNANT NEOPLASM OF BREAST: ICD-10-CM

## 2021-01-05 DIAGNOSIS — I10 ESSENTIAL HYPERTENSION: ICD-10-CM

## 2021-01-05 DIAGNOSIS — R73.9 HYPERGLYCEMIA: ICD-10-CM

## 2021-01-05 DIAGNOSIS — Z12.11 COLON CANCER SCREENING: ICD-10-CM

## 2021-01-05 DIAGNOSIS — E89.0 POSTSURGICAL HYPOTHYROIDISM: Primary | ICD-10-CM

## 2021-01-05 PROCEDURE — G8427 DOCREV CUR MEDS BY ELIG CLIN: HCPCS | Performed by: FAMILY MEDICINE

## 2021-01-05 PROCEDURE — 3017F COLORECTAL CA SCREEN DOC REV: CPT | Performed by: FAMILY MEDICINE

## 2021-01-05 PROCEDURE — 99214 OFFICE O/P EST MOD 30 MIN: CPT | Performed by: FAMILY MEDICINE

## 2021-01-05 RX ORDER — TRIAMTERENE AND HYDROCHLOROTHIAZIDE 75; 50 MG/1; MG/1
1 TABLET ORAL DAILY
Qty: 90 TABLET | Refills: 3 | Status: SHIPPED
Start: 2021-01-05 | End: 2021-02-22 | Stop reason: ALTCHOICE

## 2021-01-05 RX ORDER — LEVOTHYROXINE SODIUM 25 MCG
25 TABLET ORAL DAILY
Qty: 30 TABLET | Refills: 5 | Status: SHIPPED
Start: 2021-01-05 | End: 2021-07-26 | Stop reason: SDUPTHER

## 2021-01-05 RX ORDER — LEVOTHYROXINE SODIUM 200 MCG
200 TABLET ORAL DAILY
Qty: 30 TABLET | Refills: 5 | Status: SHIPPED
Start: 2021-01-05 | End: 2021-07-26 | Stop reason: SDUPTHER

## 2021-01-05 SDOH — ECONOMIC STABILITY: FOOD INSECURITY: WITHIN THE PAST 12 MONTHS, YOU WORRIED THAT YOUR FOOD WOULD RUN OUT BEFORE YOU GOT MONEY TO BUY MORE.: NEVER TRUE

## 2021-01-05 ASSESSMENT — PATIENT HEALTH QUESTIONNAIRE - PHQ9
2. FEELING DOWN, DEPRESSED OR HOPELESS: 1
SUM OF ALL RESPONSES TO PHQ9 QUESTIONS 1 & 2: 2
SUM OF ALL RESPONSES TO PHQ QUESTIONS 1-9: 2
SUM OF ALL RESPONSES TO PHQ QUESTIONS 1-9: 2

## 2021-01-05 NOTE — PATIENT INSTRUCTIONS
FOLLOW UP OF 1/5/21 VISIT:    1) Medications have been refills.   Synthroids have gone to [facility] (Google \"Synthroid Delivers\" to enroll) and new diuretic (Maxzide ) went To [facility]    2) Please schedule mammogram    3) Please schedule consultation for colon cancer screening    4) OV in 6 weeks with lab work the week before

## 2021-01-06 ENCOUNTER — TELEPHONE (OUTPATIENT)
Dept: SURGERY | Age: 51
End: 2021-01-06

## 2021-01-06 NOTE — TELEPHONE ENCOUNTER
MA contacted patient to schedule an appointment for colonoscopy consult based on referral by Dr Aidee Wilkins. Patient scheduled appointment for 1/19/21 @ 1:30pm at the [facility] office with Dr Yehuda Vieira. Patient informed of location and what to bring to appointment. Appointment letter mailed to patient.      Electronically signed by John Dasilva on 1/6/21 at 9:52 AM EST

## 2021-01-18 ENCOUNTER — TELEPHONE (OUTPATIENT)
Dept: FAMILY MEDICINE CLINIC | Age: 51
End: 2021-01-18

## 2021-01-18 NOTE — TELEPHONE ENCOUNTER
Patient called stating that the synthroid prescriptions, 200 and 25 mcg had not been sent to the [facility] mail order pharmacy. Checked chart, prescriptions were sent through electronically so contacted the synthroid pharmacy regarding this. Verbally gave the prescription orders for synthroid 200 and 25 mcg, quantity 30 with 5 refills. Spoke with pharmacist, Олег De Guzman. Contacted patient that verbally gave the prescriptions over the phone to pharmacist Олег De Guzman. And to make sure that she is signed up on her end to be sure to get the synthroid medication through the mail. Patient voiced understanding of this.

## 2021-01-19 ENCOUNTER — OFFICE VISIT (OUTPATIENT)
Dept: SURGERY | Age: 51
End: 2021-01-19

## 2021-01-19 VITALS
OXYGEN SATURATION: 95 % | DIASTOLIC BLOOD PRESSURE: 90 MMHG | TEMPERATURE: 97.7 F | BODY MASS INDEX: 39.68 KG/M2 | WEIGHT: 293 LBS | HEIGHT: 72 IN | SYSTOLIC BLOOD PRESSURE: 140 MMHG | HEART RATE: 84 BPM | RESPIRATION RATE: 18 BRPM

## 2021-01-19 DIAGNOSIS — Z12.11 ENCOUNTER FOR SCREENING COLONOSCOPY: Primary | ICD-10-CM

## 2021-01-19 PROCEDURE — 99999 PR OFFICE/OUTPT VISIT,PROCEDURE ONLY: CPT | Performed by: SURGERY

## 2021-01-19 NOTE — PROGRESS NOTES
[facility] Surgery   History and Physical    Patient's Name/Date of Birth: Fiona Adan / 1970 (44 y.o.)      PCP: Boom Flores MD      CC:  Screening c scope    HPI:  44 y.o. female  No sx no wt loss, no hx cancer family or personal.  Had scopes 10 + years ago due to GIB source was a gastric ulcer.   Takes     Past Medical History:   Diagnosis Date    Brain tumor ([address]     Headache     Heart beat abnormality     fluttering    Hypothyroidism     Meningioma (HCC)     Obesity        Past Surgical History:   Procedure Laterality Date    BRAIN SURGERY      FINGER SURGERY      GASTRIC BYPASS SURGERY  2003    THYROIDECTOMY         Family History   Adopted: Yes   Problem Relation Age of Onset    Heart Disease Mother     Obesity Mother     Arthritis Mother     Heart Disease Father        Social History     Socioeconomic History    Marital status: Single     Spouse name: Not on file    Number of children: Not on file    Years of education: Not on file    Highest education level: Not on file   Occupational History    Not on file   Social Needs    Financial resource strain: Not hard at all   [employer] insecurity     Worry: Never true     Inability: Never true   [employer] needs     Medical: Not on file     Non-medical: Not on file   Tobacco Use    Smoking status: Never Smoker    Smokeless tobacco: Never Used   Substance and Sexual Activity    Alcohol use: No     Comment: social    Drug use: No    Sexual activity: Not on file   Lifestyle    Physical activity     Days per week: Not on file     Minutes per session: Not on file    Stress: Not on file   Relationships    Social connections     Talks on phone: Not on file     Gets together: Not on file     Attends [employer] service: Not on file     Active member of club or organization: Not on file     Attends meetings of clubs or organizations: Not on file     Relationship status: Not on file  Intimate partner violence     Fear of current or ex partner: Not on file     Emotionally abused: Not on file     Physically abused: Not on file     Forced sexual activity: Not on file   Other Topics Concern    Not on file   Social History Narrative    Not on file       Current Outpatient Medications   Medication Sig Dispense Refill    SYNTHROID 25 MCG tablet Take 1 tablet by mouth Daily 30 tablet 5    SYNTHROID 200 MCG tablet Take 1 tablet by mouth Daily 30 tablet 5    hydroCHLOROthiazide (HYDRODIURIL) 25 MG tablet Take 1 tablet by mouth daily 30 tablet 5    mometasone (ELOCON) 0.1 % cream DANETTE EXT AA  BID  3    aspirin 325 MG EC tablet Take 325 mg by mouth daily      triamterene-hydroCHLOROthiazide (MAXZIDE) 75-50 MG per tablet Take 1 tablet by mouth daily (Patient not taking: Reported on 1/19/2021) 90 tablet 3    SYNTHROID 25 MCG tablet Take 1 tablet by mouth Daily for 7 days 7 tablet 0    SYNTHROID 200 MCG tablet Take 1 tablet by mouth Daily for 7 days 7 tablet 0    traZODone (DESYREL) 50 MG tablet Take 1 tablet by mouth nightly (Patient not taking: Reported on 1/19/2021) 30 tablet 0     No current facility-administered medications for this visit. Allergies   Allergen Reactions    Seasonal        REVIEW OF SYSTEMS:    Constitutional: negative   Eyes: negative  Ears, nose, mouth, throat, and face: negative  Respiratory: negative  Cardiovascular: negative  Gastrointestinal: negative  Genitourinary:negative  Integument/breast: negative  Hematologic/lymphatic: negative  Musculoskeletal:negative  Neurological: negative  Allergic/Immunologic: negative    Physical Exam:    @BP (!) 140/90   Pulse 84   Temp 97.7 °F (36.5 °C) (Infrared)   Resp 18   Ht 6' 1\" (1.854 m)   Wt (!) 390 lb (176.9 kg)   SpO2 95%   BMI 51.45 kg/m² @    GENERAL EXAM: On exam- pt appears stated age. No acute distress. NEURO:  Alert and oriented x 3.  No obvious neuro deficits HEENT: head- atraumatic-normocephalic. No discharge from ears, nose or throat. NECK: Supple. No jugular venous distention. CVS:RR. LUNGS: Bilateral chest movements without the use of accessory muscles. Respirations easy, nonlabored,   ABDOMEN: Abdomen soft, nondistended, nontender, No palpable hernias noted. RECTAL: exam deferred. EXTREMITIES: No edema, NVI and symmetrical        IMPRESSION/PLAN: This is a 44 y.o. female who has average risk for CRC     I recommended colonoscopy with possible biopsy or polypectomy and I explained therisk, benefits, expected outcome, and alternatives to the procedure. Risks included but are not limited to bleeding, infection, respiratory distress, hypotension, and perforation. Clarissa Aguilera understands and is in agreement.       Electronically Signed by Neisha Davalos MD FACS   1:41 PM

## 2021-01-19 NOTE — PATIENT INSTRUCTIONS
Patient Information and Instructions for Colonoscopy         Definition of Colonoscopy   A colonoscopy is the visual exam of the rectum and colon (large intestine). The exam is done with a tool called a colonoscope. The colonoscope is a flexible tube with a tiny camera on the end. This instrument allows the doctor to view the inside of your rectum and colon. Sigmoidoscopy is a shorter scope that views only the last one third of the colon. Reasons for Colonoscopy   It is used to examine, diagnose, and treat problems in your large intestine. The procedure is most often done for the following reasons: To determine the cause of abdominal pain, rectal bleeding, or a change in bowel habits   To detect and treat colon cancer or colon polyps   To obtain tissue samples for testing   To stop intestinal bleeding   Monitor response to treatment if you have inflammatory bowel disease     Possible Complications   Complications are rare, but no procedure is completely free of risk. If you are planning to have a colonoscopy, your doctor will review a list of possible complications, which may include:   Bleeding   Reaction to the sedation causing drop in your blood pressure or problems breathing  Perforation or puncture of the bowel     Factors that may increase the risk of complications include:   Pre-existing heart or kidney condition   Treatment with certain medicines, including aspirin and other drugs with anticoagulant or blood-thinning properties   Prior abdominal surgery or radiation treatments   Active colitis , diverticulitis , or other acute bowel disease   Previous treatment with radiation therapy     Be sure to discuss these risks with your doctor before the procedure.      What to Expect   Prior to Procedure   Your doctor will likely do the following:   Physical exam   Health history   Review of medicines   Test your stool for hidden blood (called \"occult blood\") Your colon must be completely clean before the procedure. Any stool left in the intestine will block the view. This preparation may start several days before the procedure. Follow your doctor's instructions. Leading up to your procedure:   Talk to your doctor about your medicines. You may be asked to stop taking some medicines up to one week before the procedure, like:   Anti-inflammatory drugs (e.g., aspirin )   Blood thinners like clopidogrel (Plavix) or warfarin (Coumadin)   Iron supplements or vitamins containing iron   The day or days before your procedure, go on a clear liquid diet (clear broth, clear juice, clear jello) with no red coloring  Do not eat or drink anything after midnight. Wear comfortable clothing. If you have diabetes, ask your doctor if you need to adjust your diabetes medicine on the day prior to your procedure and the day of your procedure. Arrange for a ride home after the procedure. Anesthesia   You will receive intravenous sedation medicine for the procedure so you will not feel anything during the procedure. Description of the Procedure   You will lie on your left side with knees bent and drawn up toward your chest. The colonoscope will be slowly inserted through the rectum and into the bowel. The colonoscope will inject air into the colon. A small attached video camera will allow the doctor to view the colon's lining on a screen. The doctor will continue guiding the tool through the bowel and assess the lining. A tissue sample or polyps may be removed during the procedure. How Long Will It Take? Usually it takes about 30 to 45 minutes     Will It Hurt? Most people do not feel anything during the procedure and will not remember the procedure. After the procedure, gas pains and cramping are common. These pains should go away with the passing of gas.      Post-procedure Care   If any tissue was removed: A clear liquid diet helps maintain adequate hydration, provides some important electrolytes, such as sodium and potassium, and gives some energy at a time when a full diet isn't possible or recommended. The following foods are allowed in a clear liquid diet:   ? Plain water   ? Fruit juices without pulp, such as apple juice, white grape juice. ? Strained lemonade   ? Clear, fat-free broth (bouillon or consomme)   ? Clear sodas    ? Plain gelatin (Not RED or PURPLE)  ? Honey   ? Ice pops without bits of fruit or fruit pulp   ? Tea or coffee without milk or cream  ? ** NOTHING RED OR PURPLE    Any foods not on the above list should be avoided. Also, for certain tests, such as colon exams, your doctor may ask you to avoid liquids or gelatin with red coloring. A typical menu on the clear liquid diet may look like this:   Breakfast:  1 glass fruit juice  1 cup coffee or tea (without dairy products)  1 cup broth  1 bowl gelatin     Snack:  1 glass fruit juice  1 bowl gelatin     Lunch:  1 glass fruit juice  1 glass water  1 cup broth  1 bowl gelatin     Snack:  1 ice pop (without fruit pulp)  1 cup coffee or tea (without dairy products) or a soft drink     Dinner:  1 cup juice or water  1 cup broth  1 bowl gelatin  1 cup coffee or tea     Purchase this over the counter:  1. GATORADE/Clear liquid (64 ounces)   Pick these up at the pharmacy:  2. DULCOLAX 5 mg tablets (four tablets)  3. MIRALAX BOTTLE 238 grams (over the counter only)    The DAY BEFORE your colonoscopy:   Drink only clear liquids. (Absolutely no solid food)    COLONOSCOPY PREP:  3 PM: Take 2 DULCOLAX tablets    5 PM: Mix the entire bottle of MIRALAX into the 64 ounces of GATORADE. (Put half the bottle in each 32 ounce bottle). Shake the solution until fully dissolved. Drink an 8 ounce glass every 30 minutes until the solution is gone. 7 PM: Take the last 2 DULCOLAX tablets.     **DO NOT EAT OR DRINK ANYTHING AFTER MIDNIGHT** The DAY OF your colonoscopy: You may take any necessary medications with a sip of water. Bring along someone to take you home. REMEMBER: The preparation is very important. An adequate clean out allows for the best evaluation of your entire colon. During the prep, using baby wipes may ease some of your discomfort. You should NOT plan on working or driving the rest of the day due to sedation given at the procedure    Any Questions or concerns contact April at [phone].

## 2021-01-20 ENCOUNTER — TELEPHONE (OUTPATIENT)
Dept: SURGERY | Age: 51
End: 2021-01-20

## 2021-01-20 NOTE — TELEPHONE ENCOUNTER
Scheduled patient for colonoscopy on 2/3/21 at [place] [address] in [facility]. Patient needs to report at the front entrance 1 hour prior to the procedure, no ASA products for 7 days, remind patient to do the colon prep as well as a clear liquid diet a day before. Attempted to call the patient, no answer. Left message on the phone. Instruction letter mailed. Encouraged patient to call our office if any questions.     Electronically signed by Eliz Washington on 1/20/21 at 10:08 AM EST

## 2021-01-20 NOTE — TELEPHONE ENCOUNTER
Prior Authorization Form:      DEMOGRAPHICS:                     Patient Name:  Brenden Novak  Patient :  1970            Insurance:  Payor: [employer] / Plan: [employer] / Product Type: *No Product type* /   Insurance ID Number:    Payor/Plan Subscr  Sex Relation Sub.  Ins. ID Effective Group Num   1. [facility] 1970 Female Self [id number]                                   [address] [address]         DIAGNOSIS & PROCEDURE:                       Procedure/Operation: colonoscopy           CPT Code: 39281    Diagnosis:  Screening    ICD10 Code: Z12.11    Location:  [place]     Surgeon:  Dr. Salud Burns INFORMATION:                          Date: 2/3/21  Time: 10am             Anesthesia:  [facility]                                                       Status:  General        Special Comments:  N/A       Electronically signed by Matt Reagan on 2021 at 10:10 AM

## 2021-01-29 ENCOUNTER — HOSPITAL ENCOUNTER (OUTPATIENT)
Age: 51
Discharge: HOME | End: 2021-01-31
Payer: COMMERCIAL

## 2021-01-29 DIAGNOSIS — U07.1 COVID-19: ICD-10-CM

## 2021-01-29 PROCEDURE — U0003 INFECTIOUS AGENT DETECTION BY NUCLEIC ACID (DNA OR RNA); SEVERE ACUTE RESPIRATORY SYNDROME CORONAVIRUS 2 (SARS-COV-2) (CORONAVIRUS DISEASE [COVID-19]), AMPLIFIED PROBE TECHNIQUE, MAKING USE OF HIGH THROUGHPUT TECHNOLOGIES AS DESCRIBED BY CMS-2020-01-R: HCPCS

## 2021-01-29 NOTE — PROGRESS NOTES
[place] [facility] PRE-ADMISSION TESTING ENDOSCOPY INSTRUCTIONS- [facility]-phone number:[phone]    ENDOSCOPY INSTRUCTIONS:   [x] Bowel prep instructions reviewed. [x] Nothing by mouth after midnight, including gum, candy, mints, or water. Please follow your surgeons instructions if you are required to complete a bowel prep. Colonoscopy- no solid food-only clear liquids the day prior). [x] You may brush your teeth, gargle, but do NOT swallow water. [x] Do not wear makeup, lotions, powders, deodorant. Nail polish as directed by the nurse. [x] Arrange transportation with a responsible adult  to and from the hospital. If you do not have a responsible adult  to transport you, you will need to make arrangements with a medical transportation company (i.e. [employer]. A Uber/taxi/bus is not appropriate unless you are accompanied by a responsible adult ). Arrange for someone to be with you for the remainder of the day and for 24 hours after your procedure due to having had anesthesia. Who will be your  for transportation?_sherman_________________   Who will be staying with you for 24 hrs after your procedure?_sherman_________________    PARKING INSTRUCTIONS:   [x] Arrival Time:_0845  Enter the main entrance one person may come with you. Wear a mask._______________________          [x] To reach the [facility] from [facility], upon entering the hospital, take elevator B to the 3rd floor. Check in with .  A parking token will be provided if needed. EDUCATION INSTRUCTIONS:  [x] Bring a complete list of your medications, please write the last time you took the medicine, give this list to the nurse.  [] Take the following medications the morning of surgery with 1-2 ounces of water: none  [] Stop herbal supplements and vitamins 5 days before your surgery. [] DO NOT take any diabetic medicine the morning of surgery.   Follow instructions for insulin the day before surgery. [] If you are diabetic and your blood sugar is low or you feel symptomatic, you may drink 1-2 ounces of apple juice or take a glucose tablet. The morning of your procedure, you may call the pre-op area if you have concerns about your blood sugar [phone]. [] Use your inhalers the morning of surgery. Bring your emergency inhaler with you day of surgery. [] Follow physician instructions regarding any blood thinners you may be taking. WHAT TO EXPECT:  [x] The day of your procedure you will be greeted and checked in by the [facility] [employer] [facility].  In addition, you will be registered in the [facility] by a Patient Access Representative. Please bring your photo ID and insurance card. A nurse will greet you in accordance to the time you are needed in the pre-op area to prepare you for surgery. Please do not be discouraged if you are not greeted in the order you arrive as there are many variables that are involved in patient preparation. Your patience is greatly appreciated as you wait for your nurse. [x]  Delays may occur. Staff will make a sincere effort to keep you informed of delays. If any delays occur with your procedure, we apologize ahead of time for your inconvenience as we recognize the value of your time.

## 2021-01-31 LAB
SARS-COV-2: NOT DETECTED
SOURCE: NORMAL

## 2021-02-01 ENCOUNTER — PREP FOR PROCEDURE (OUTPATIENT)
Dept: SURGERY | Age: 51
End: 2021-02-01

## 2021-02-01 RX ORDER — SODIUM CHLORIDE 0.9 % (FLUSH) 0.9 %
10 SYRINGE (ML) INJECTION EVERY 12 HOURS SCHEDULED
Status: CANCELLED | OUTPATIENT
Start: 2021-02-01

## 2021-02-01 RX ORDER — SODIUM CHLORIDE 0.9 % (FLUSH) 0.9 %
10 SYRINGE (ML) INJECTION PRN
Status: CANCELLED | OUTPATIENT
Start: 2021-02-01

## 2021-02-03 ENCOUNTER — ANESTHESIA (OUTPATIENT)
Dept: ENDOSCOPY | Age: 51
End: 2021-02-03
Payer: COMMERCIAL

## 2021-02-03 ENCOUNTER — HOSPITAL ENCOUNTER (OUTPATIENT)
Age: 51
Setting detail: OUTPATIENT SURGERY
Discharge: HOME | End: 2021-02-03
Attending: SURGERY | Admitting: SURGERY
Payer: COMMERCIAL

## 2021-02-03 ENCOUNTER — ANESTHESIA EVENT (OUTPATIENT)
Dept: ENDOSCOPY | Age: 51
End: 2021-02-03
Payer: COMMERCIAL

## 2021-02-03 VITALS
DIASTOLIC BLOOD PRESSURE: 92 MMHG | TEMPERATURE: 97.6 F | OXYGEN SATURATION: 98 % | WEIGHT: 293 LBS | RESPIRATION RATE: 18 BRPM | BODY MASS INDEX: 39.68 KG/M2 | SYSTOLIC BLOOD PRESSURE: 171 MMHG | HEART RATE: 62 BPM | HEIGHT: 72 IN

## 2021-02-03 VITALS
DIASTOLIC BLOOD PRESSURE: 66 MMHG | SYSTOLIC BLOOD PRESSURE: 145 MMHG | RESPIRATION RATE: 12 BRPM | OXYGEN SATURATION: 100 %

## 2021-02-03 DIAGNOSIS — U07.1 COVID-19: Primary | ICD-10-CM

## 2021-02-03 PROCEDURE — 2709999900 HC NON-CHARGEABLE SUPPLY: Performed by: SURGERY

## 2021-02-03 PROCEDURE — 3609010300 HC COLONOSCOPY W/BIOPSY SINGLE/MULTIPLE: Performed by: SURGERY

## 2021-02-03 PROCEDURE — 6360000002 HC RX W HCPCS: Performed by: NURSE ANESTHETIST, CERTIFIED REGISTERED

## 2021-02-03 PROCEDURE — 88305 TISSUE EXAM BY PATHOLOGIST: CPT

## 2021-02-03 PROCEDURE — 2580000003 HC RX 258: Performed by: ANESTHESIOLOGY

## 2021-02-03 PROCEDURE — 3609010600 HC COLONOSCOPY POLYPECTOMY SNARE/COLD BIOPSY: Performed by: SURGERY

## 2021-02-03 PROCEDURE — 3700000001 HC ADD 15 MINUTES (ANESTHESIA): Performed by: SURGERY

## 2021-02-03 PROCEDURE — 45380 COLONOSCOPY AND BIOPSY: CPT | Performed by: SURGERY

## 2021-02-03 PROCEDURE — 7100000010 HC PHASE II RECOVERY - FIRST 15 MIN: Performed by: SURGERY

## 2021-02-03 PROCEDURE — 3609010400 HC COLONOSCOPY POLYPECTOMY HOT BIOPSY: Performed by: SURGERY

## 2021-02-03 PROCEDURE — 7100000011 HC PHASE II RECOVERY - ADDTL 15 MIN: Performed by: SURGERY

## 2021-02-03 PROCEDURE — 3700000000 HC ANESTHESIA ATTENDED CARE: Performed by: SURGERY

## 2021-02-03 RX ORDER — SODIUM CHLORIDE 0.9 % (FLUSH) 0.9 %
10 SYRINGE (ML) INJECTION EVERY 12 HOURS SCHEDULED
Status: DISCONTINUED | OUTPATIENT
Start: 2021-02-03 | End: 2021-02-03 | Stop reason: HOSPADM

## 2021-02-03 RX ORDER — SODIUM CHLORIDE 0.9 % (FLUSH) 0.9 %
10 SYRINGE (ML) INJECTION PRN
Status: DISCONTINUED | OUTPATIENT
Start: 2021-02-03 | End: 2021-02-03 | Stop reason: HOSPADM

## 2021-02-03 RX ORDER — SODIUM CHLORIDE 9 MG/ML
INJECTION, SOLUTION INTRAVENOUS CONTINUOUS
Status: DISCONTINUED | OUTPATIENT
Start: 2021-02-03 | End: 2021-02-03 | Stop reason: HOSPADM

## 2021-02-03 RX ORDER — PROPOFOL 10 MG/ML
INJECTION, EMULSION INTRAVENOUS PRN
Status: DISCONTINUED | OUTPATIENT
Start: 2021-02-03 | End: 2021-02-03 | Stop reason: SDUPTHER

## 2021-02-03 RX ADMIN — PROPOFOL 20 MG: 10 INJECTION, EMULSION INTRAVENOUS at 12:29

## 2021-02-03 RX ADMIN — PROPOFOL 20 MG: 10 INJECTION, EMULSION INTRAVENOUS at 12:31

## 2021-02-03 RX ADMIN — PROPOFOL 50 MG: 10 INJECTION, EMULSION INTRAVENOUS at 12:15

## 2021-02-03 RX ADMIN — PROPOFOL 20 MG: 10 INJECTION, EMULSION INTRAVENOUS at 12:23

## 2021-02-03 RX ADMIN — PROPOFOL 20 MG: 10 INJECTION, EMULSION INTRAVENOUS at 12:21

## 2021-02-03 RX ADMIN — SODIUM CHLORIDE: 9 INJECTION, SOLUTION INTRAVENOUS at 12:13

## 2021-02-03 RX ADMIN — PROPOFOL 20 MG: 10 INJECTION, EMULSION INTRAVENOUS at 12:19

## 2021-02-03 RX ADMIN — SODIUM CHLORIDE: 9 INJECTION, SOLUTION INTRAVENOUS at 09:40

## 2021-02-03 RX ADMIN — PROPOFOL 20 MG: 10 INJECTION, EMULSION INTRAVENOUS at 12:25

## 2021-02-03 RX ADMIN — PROPOFOL 20 MG: 10 INJECTION, EMULSION INTRAVENOUS at 12:33

## 2021-02-03 RX ADMIN — PROPOFOL 20 MG: 10 INJECTION, EMULSION INTRAVENOUS at 12:17

## 2021-02-03 RX ADMIN — PROPOFOL 500 MG: 10 INJECTION, EMULSION INTRAVENOUS at 12:51

## 2021-02-03 RX ADMIN — PROPOFOL 20 MG: 10 INJECTION, EMULSION INTRAVENOUS at 12:27

## 2021-02-03 ASSESSMENT — PAIN SCALES - GENERAL
PAINLEVEL_OUTOF10: 0
PAINLEVEL_OUTOF10: 0

## 2021-02-03 ASSESSMENT — PAIN - FUNCTIONAL ASSESSMENT: PAIN_FUNCTIONAL_ASSESSMENT: 0-10

## 2021-02-03 ASSESSMENT — ENCOUNTER SYMPTOMS: SHORTNESS OF BREATH: 1

## 2021-02-03 NOTE — PROGRESS NOTES
Patient admitted to [facility] from endo. Patient given call light. Bed is in low position with side rails up. Patient oriented to [facility] and understands routine of care prior to going home.

## 2021-02-03 NOTE — PROGRESS NOTES
1400-Patient and family verbaize understanding of given discharge instructions. 1413- Patient discharged per wheelchair to car driven by in stable condition.

## 2021-02-03 NOTE — OP NOTE
Colonoscopy Op Note    DATE OF PROCEDURE: 2/3/2021    SURGEON: Belgica Fabian MD    PREOPERATIVE DIAGNOSIS: screening colonoscopy     POSTOPERATIVE DIAGNOSIS: Same, diverticulosis, descending colon and rectal polyps    OPERATION: Procedure(s):  COLONOSCOPY WITH BIOPSY  COLONOSCOPY POLYPECTOMY HOT BIOPSY    ANESTHESIA: Local monitored anesthesia. ESTIMATED BLOOD LOSS: nil     COMPLICATIONS: None. SPECIMENS:   ID Type Source Tests Collected by Time Destination   A :  Tissue Colon SURGICAL PATHOLOGY Belgica Fabian MD 2/3/2021 1241    B :  Specimen Rectum SURGICAL PATHOLOGY Belgica Fabian MD 2/3/2021 1249        HISTORY: The patient is a 44y.o. year old female with history of above preop diagnosis. I recommended colonoscopy with possible biopsy or polypectomy and I explained the risk, benefits, expected outcome, and alternatives to the procedure. Risks included but are not limited to bleeding, infection, respiratory distress, hypotension, and perforation of the colon. The patient understands and is in agreement. PROCEDURE: The patient was given IV conscious sedation per anesthesia. The patient was given supplemental oxygen by nasal cannula. The colonoscope was inserted per rectum and advanced under direct vision to the cecum with difficulty, identified by IC valve  The prep was fair so exam was suboptimal and a small mucosal lesion could have been missed. FINDINGS:    SHARI: normal external skin tags     Cecum/Ascending colon:normal     Transverse colon: normal     Descending/Sigmoid colon: normal small hyperplastic appearing polyp bx taken with cold forceps     Rectum/Anus: examined in normal and retroflexed positions, normal pedunculated 0.5cm rectal polyps removed     The colon was decompressed and the scope was removed. The withdraw time was approximately 15 minutes. The patient tolerated the procedure well. ASSESSMENT/PLAN:   1.  Colorectal Cancer Screening - recommend repeat colonoscopy in 5 years due to polyps. Sooner if issues/concerns.     Irma Sheppard MD  02/03/21  12:52 PM

## 2021-02-03 NOTE — ANESTHESIA PRE PROCEDURE
Department of Anesthesiology  Preprocedure Note       Name:  Kamryn Alexis   Age:  44 y.o.  :  1970                                          MRN:  [id number]         Date:  2/3/2021      Surgeon: Wyatt Baer):  Amy Grant MD    Procedure: Procedure(s):  COLORECTAL CANCER SCREENING, NOT HIGH RISK    Medications prior to admission:   Prior to Admission medications    Medication Sig Start Date End Date Taking? Authorizing Provider   SYNTHROID 25 MCG tablet Take 1 tablet by mouth Daily 21  Yes Charlotte Muñoz MD   SYNTHROID 200 MCG tablet Take 1 tablet by mouth Daily 21  Yes Charlotte Muñoz MD   triamterene-hydroCHLOROthiazide (MAXZIDE) 75-50 MG per tablet Take 1 tablet by mouth daily 21 Yes Dania Aleman MD   hydroCHLOROthiazide (HYDRODIURIL) 25 MG tablet Take 1 tablet by mouth daily 3/6/20  Yes Dania Aleman MD   mometasone (ELOCON) 0.1 % cream DANETTE EXT AA  BID 18  Yes Historical Provider, MD   SYNTHROID 25 MCG tablet Take 1 tablet by mouth Daily for 7 days 20  Charlotte Muñoz MD   SYNTHROID 200 MCG tablet Take 1 tablet by mouth Daily for 7 days 20  Charlotte Muñoz MD       Current medications:    Current Facility-Administered Medications   Medication Dose Route Frequency Provider Last Rate Last Admin    sodium chloride flush 0.9 % injection 10 mL  10 mL Intravenous 2 times per day Amy Grant MD        sodium chloride flush 0.9 % injection 10 mL  10 mL Intravenous PRN Amy Grant MD        0.9 % sodium chloride infusion   Intravenous Continuous Aaron Owens  mL/hr at 21 0940 New Bag at 21 0940       Allergies:     Allergies   Allergen Reactions    Seasonal        Problem List:    Patient Active Problem List   Diagnosis Code    Chest pain R07.9    Postsurgical hypothyroidism E89.0    Morbid obesity ([facility] E66.01    Meningioma ([facility] D32.9 CREATININE 0.7 10/02/2020    GFRAA >60 10/02/2020    LABGLOM >60 10/02/2020    GLUCOSE 107 10/02/2020    PROT 7.1 10/02/2020    CALCIUM 9.5 10/02/2020    BILITOT 1.0 10/02/2020    ALKPHOS 83 10/02/2020    AST 24 10/02/2020    ALT 24 10/02/2020       POC Tests: No results for input(s): POCGLU, POCNA, POCK, POCCL, POCBUN, POCHEMO, POCHCT in the last 72 hours. Coags: No results found for: PROTIME, INR, APTT    HCG (If Applicable):   Lab Results   Component Value Date    PREGTESTUR negative 10/17/2014        ABGs: No results found for: PHART, PO2ART, IML9TDM, UDE9URF, BEART, U2HSBRME     Type & Screen (If Applicable):  No results found for: LABABO, LABRH    Drug/Infectious Status (If Applicable):  No results found for: HIV, HEPCAB    COVID-19 Screening (If Applicable):   Lab Results   Component Value Date    COVID19 Not Detected 01/29/2021         Anesthesia Evaluation  Patient summary reviewed no history of anesthetic complications:   Airway: Mallampati: II  TM distance: >3 FB   Neck ROM: full  Mouth opening: > = 3 FB Dental:          Pulmonary: breath sounds clear to auscultation  (+) shortness of breath:                            ROS comment: CXR 2014: IMPRESSION:  No airspace opacities or pleural effusion. Cardiovascular:    (+) hypertension:,         Rhythm: regular  Rate: normal    Stress test reviewed             ROS comment: Stress Test 2014: IMPRESSION:  1. No evidence of stress induced myocardial ischemia or  infarction. 2. Normal left ventricular wall motion and ejection fraction  measuring 66%. Neuro/Psych:                ROS comment: Hx of meningiomas/p excision GI/Hepatic/Renal:   (+) PUD, morbid obesity (super morbid obesity)         ROS comment: S/p gastric bypass. Endo/Other:    (+) hypothyroidism::., .                 Abdominal:   (+) obese,         Vascular: negative vascular ROS.                                      Anesthesia Plan      MAC     ASA 3 Induction: intravenous. MIPS: Prophylactic antiemetics administered. Anesthetic plan and risks discussed with patient.                       Harvinder Long MD   2/3/2021

## 2021-02-03 NOTE — H&P
[address] Surgery   History and Physical     Patient's Name/Date of Birth: Jami Steele / 1970 (44 y.o.)        PCP: Jenn Mckeon MD        CC:  Screening c scope     HPI:  44 y.o. female  No sx no wt loss, no hx cancer family or personal.  Had scopes 10 + years ago due to GIB source was a gastric ulcer. Takes      Past Medical History        Past Medical History:   Diagnosis Date    Brain tumor ([address]      Headache      Heart beat abnormality       fluttering    Hypothyroidism      Meningioma (HCC)      Obesity              Past Surgical History         Past Surgical History:   Procedure Laterality Date    BRAIN SURGERY        FINGER SURGERY        GASTRIC BYPASS SURGERY   2003    THYROIDECTOMY                Family History         Family History   Adopted: Yes   Problem Relation Age of Onset    Heart Disease Mother      Obesity Mother      Arthritis Mother      Heart Disease Father              Social History               Socioeconomic History    Marital status: Single       Spouse name: Not on file    Number of children: Not on file    Years of education: Not on file    Highest education level: Not on file   Occupational History    Not on file   Social Needs    Financial resource strain: Not hard at all   [employer] insecurity       Worry: Never true       Inability: Never true    Transportation needs       Medical: Not on file       Non-medical: Not on file   Tobacco Use    Smoking status: Never Smoker    Smokeless tobacco: Never Used   Substance and Sexual Activity    Alcohol use:  No       Comment: social    Drug use: No    Sexual activity: Not on file   Lifestyle    Physical activity       Days per week: Not on file       Minutes per session: Not on file    Stress: Not on file   Relationships    Social connections       Talks on phone: Not on file       Gets together: Not on file       Attends Gnosticism service: Not on file       Active member of club or organization: Not on file       Attends meetings of clubs or organizations: Not on file       Relationship status: Not on file    Intimate partner violence       Fear of current or ex partner: Not on file       Emotionally abused: Not on file       Physically abused: Not on file       Forced sexual activity: Not on file   Other Topics Concern    Not on file   Social History Narrative    Not on file            Current Facility-Administered Medications          Current Outpatient Medications   Medication Sig Dispense Refill    SYNTHROID 25 MCG tablet Take 1 tablet by mouth Daily 30 tablet 5    SYNTHROID 200 MCG tablet Take 1 tablet by mouth Daily 30 tablet 5    hydroCHLOROthiazide (HYDRODIURIL) 25 MG tablet Take 1 tablet by mouth daily 30 tablet 5    mometasone (ELOCON) 0.1 % cream DANETTE EXT AA  BID   3    aspirin 325 MG EC tablet Take 325 mg by mouth daily        triamterene-hydroCHLOROthiazide (MAXZIDE) 75-50 MG per tablet Take 1 tablet by mouth daily (Patient not taking: Reported on 1/19/2021) 90 tablet 3    SYNTHROID 25 MCG tablet Take 1 tablet by mouth Daily for 7 days 7 tablet 0    SYNTHROID 200 MCG tablet Take 1 tablet by mouth Daily for 7 days 7 tablet 0    traZODone (DESYREL) 50 MG tablet Take 1 tablet by mouth nightly (Patient not taking: Reported on 1/19/2021) 30 tablet 0      No current facility-administered medications for this visit.                  Allergies   Allergen Reactions    Seasonal           REVIEW OF SYSTEMS:    Constitutional: negative   Eyes: negative  Ears, nose, mouth, throat, and face: negative  Respiratory: negative  Cardiovascular: negative  Gastrointestinal: negative  Genitourinary:negative  Integument/breast: negative  Hematologic/lymphatic: negative  Musculoskeletal:negative  Neurological: negative  Allergic/Immunologic: negative     Physical Exam:     @BP (!) 140/90   Pulse 84   Temp 97.7 °F (36.5 °C) (Infrared)   Resp 18   Ht 6' 1\" (1.854 m)   Wt (!) 390 lb (176.9 kg)   SpO2 95%   BMI 51.45 kg/m² @     GENERAL EXAM: On exam- pt appears stated age. No acute distress. NEURO:  Alert and oriented x 3. No obvious neuro deficits   HEENT: head- atraumatic-normocephalic. No discharge from ears, nose or throat. NECK: Supple. No jugular venous distention. CVS:RR. LUNGS: Bilateral chest movements without the use of accessory muscles. Respirations easy, nonlabored,   ABDOMEN: Abdomen soft, nondistended, nontender, No palpable hernias noted. RECTAL: exam deferred. EXTREMITIES: No edema, NVI and symmetrical        IMPRESSION/PLAN: This is a 44 y.o. female who has average risk for CRC      I recommended colonoscopy with possible biopsy or polypectomy and I explained therisk, benefits, expected outcome, and alternatives to the procedure. Risks included but are not limited to bleeding, infection, respiratory distress, hypotension, and perforation.  Janette George understands and is in agreement.  Marika Dorsey MD

## 2021-02-04 NOTE — ANESTHESIA POSTPROCEDURE EVALUATION
Department of Anesthesiology  Postprocedure Note    Patient: Brenden Novak  MRN: [id number]  YOB: 1970  Date of evaluation: 2/4/2021  Time:  2:55 PM     Procedure Summary     Date: 02/03/21 Room / Location: [facility] 01 / [facility]    Anesthesia Start: 1213 Anesthesia Stop: 1253    Procedure: COLONOSCOPY POLYPECTOMY SNARE/COLD BIOPSY Diagnosis: (SCREENING)    Surgeons: Lebron Soliz MD Responsible Provider: Rachid Merrill MD    Anesthesia Type: MAC ASA Status: 3          Anesthesia Type: MAC    Shannon Phase I: Shannon Score: 10    Shannon Phase II: Shannon Score: 10    Last vitals: Reviewed and per EMR flowsheets.        Anesthesia Post Evaluation    Patient location during evaluation: PACU  Patient participation: complete - patient participated  Level of consciousness: awake and alert  Airway patency: patent  Nausea & Vomiting: no nausea and no vomiting  Complications: no  Cardiovascular status: hemodynamically stable  Respiratory status: acceptable  Hydration status: euvolemic

## 2021-02-17 ASSESSMENT — ENCOUNTER SYMPTOMS
CONSTIPATION: 1
BLOOD IN STOOL: 0
NAUSEA: 0
COUGH: 0
ABDOMINAL PAIN: 0
VOMITING: 0
SHORTNESS OF BREATH: 0
SORE THROAT: 0
WHEEZING: 0
BACK PAIN: 0
DIARRHEA: 0

## 2021-02-17 NOTE — PROGRESS NOTES
Batool Rowe is a 44 y.o. female who presents today for     Chief Complaint   Patient presents with   Helder Babita     had colonoscopy, needs sleep study done    Anxiety     having a lot of stress and going off on anything lately, could this be hormonal          Hypothyroidism:  Patient is here today to follow up chronic hypothyroidism. This is not generally controlled on current medication regimen (generic Sythroid 225 mcg/day) She admits to frequent noncompliance with Synthroid dosing. Symptoms from thyroid standpoint include paresthesias of skin, dry skin, dry hair, poor sleep, cold intolerance, weight gain and difficulty losing weight, fatigue, constipation. Most recent labs reviewed with patient and are remarkable. Thyroid function in particular is not controlled. Thyroid ultrasound has been done in the past and is  remarkable. Thyroid antibodies have not been done in the past and are not remarkable. Patient does have exposure to radiation and/or iodine in the past.    Lab Results   Component Value Date    TSH 10.090 (H) 10/02/2020     Due for lab work    Hypertension:  Patient is here for follow up chronic hypertension. This is  generally controlled on current medication regimen. Fair compliance with HCTZ 25 mg  Most recent labs reviewed with patient and are not remarkable. No symptoms from htn standpoint per ROS. Patient is not compliant with lifestyle modifications. Patient does not smoke. Comorbid conditions include hypothyroidism and mild hyperlipidemia. Due for lab work        CURRENT STATUS:  She returns for recommended follow up. She did not get fasting lab work done in advance of her visit as recommended. She reports that she has had significant mood changes (\"mood swings\" and anxiety); hot flashes and night sweats, difficulty sleeping over the past 2 years. BP remains elevated despite medication. Due for lab work    Obstructive Sleep Apnea:   Witnessed apneas during anesthesia for recent colonoscopy. Sleep study recommended; will order sleep apnea      [facility]:  Patient's past medical, surgical, social and/or family history reviewed, updated in chart, and are non-contributory (unless otherwise stated). Medications and allergies also reviewed and updated in chart. Review of Systems  Review of Systems   Constitutional: Positive for fatigue and unexpected weight change. HENT: Negative for congestion, ear pain and sore throat. Respiratory: Negative for cough, shortness of breath and wheezing. Cardiovascular: Positive for leg swelling. Negative for chest pain and palpitations. Gastrointestinal: Positive for constipation. Negative for abdominal pain, blood in stool, diarrhea, nausea and vomiting. Endocrine: Positive for cold intolerance. Genitourinary: Negative for dysuria, frequency, hematuria and urgency. Musculoskeletal: Positive for arthralgias and myalgias. Negative for back pain and neck pain. Skin: Negative for rash. Dry skin and dry hair   Neurological: Negative for dizziness, weakness and headaches. Psychiatric/Behavioral: Positive for dysphoric mood and sleep disturbance. The patient is not nervous/anxious. Physical Exam:    VS:  BP (!) 172/106 (Site: Right Upper Arm, Position: Sitting, Cuff Size: Large Adult)   Pulse 83   Temp 96.4 °F (35.8 °C) (Infrared)   Resp 18   Ht 6' 2\" (1.88 m)   Wt (!) 387 lb 4.8 oz (175.7 kg)   LMP  (LMP Unknown)   SpO2 97%   BMI 49.73 kg/m²     LAST WEIGHT:  Wt Readings from Last 3 Encounters:   02/22/21 (!) 387 lb 4.8 oz (175.7 kg)   02/03/21 (!) 390 lb (176.9 kg)   01/19/21 (!) 390 lb (176.9 kg)       BMI Readings from Last 3 Encounters:   02/22/21 49.73 kg/m²   02/03/21 51.45 kg/m²   01/19/21 51.45 kg/m²       Physical Exam  Constitutional:       General: She is not in acute distress. Appearance: She is well-developed. She is not diaphoretic. HENT:      Head: Normocephalic and atraumatic. Right Ear: External ear normal.      Left Ear: External ear normal.      Mouth/Throat:      Pharynx: No oropharyngeal exudate. Eyes:      General: No scleral icterus. Right eye: No discharge. Conjunctiva/sclera: Conjunctivae normal.      Pupils: Pupils are equal, round, and reactive to light. Neck:      Musculoskeletal: Normal range of motion and neck supple. Thyroid: No thyromegaly. Cardiovascular:      Rate and Rhythm: Normal rate and regular rhythm. Heart sounds: Normal heart sounds. No murmur. Pulmonary:      Effort: Pulmonary effort is normal. No respiratory distress. Breath sounds: No stridor. No wheezing or rales. Chest:      Chest wall: No tenderness. Abdominal:      General: Bowel sounds are normal. There is no distension. Palpations: Abdomen is soft. There is no mass. Tenderness: There is no abdominal tenderness. There is no guarding. Musculoskeletal: Normal range of motion. General: No tenderness. Lymphadenopathy:      Cervical: No cervical adenopathy. Skin:     General: Skin is warm and dry. Coloration: Skin is not pale. Findings: No erythema or rash. Neurological:      Mental Status: She is alert and oriented to person, place, and time. Psychiatric:         Behavior: Behavior normal.         Thought Content:  Thought content normal.         Labs:  Lab Results   Component Value Date     10/02/2020    K 3.9 10/02/2020     10/02/2020    CO2 26 10/02/2020    BUN 16 10/02/2020    CREATININE 0.7 10/02/2020    PROT 7.1 10/02/2020    LABALBU 4.0 10/02/2020    CALCIUM 9.5 10/02/2020    GFRAA >60 10/02/2020    LABGLOM >60 10/02/2020    GLUCOSE 107 10/02/2020    AST 24 10/02/2020    ALT 24 10/02/2020    ALKPHOS 83 10/02/2020    BILITOT 1.0 10/02/2020    TSH 10.090 10/02/2020    CHOL 175 10/02/2020    TRIG 133 10/02/2020    HDL 65 10/02/2020    LDLCALC 83 10/02/2020    LABA1C 5.9 10/02/2020        Lab Results   Component Value Date    CHOL 175 10/02/2020    CHOL 207 (H) 03/04/2020    CHOL 197 10/12/2017     Lab Results   Component Value Date    TRIG 133 10/02/2020    TRIG 118 03/04/2020    TRIG 108 10/12/2017     Lab Results   Component Value Date    HDL 65 10/02/2020    HDL 70 03/04/2020    HDL 69 10/12/2017     Lab Results   Component Value Date    LDLCALC 83 10/02/2020    LDLCALC 113 (H) 03/04/2020    LDLCALC 106 (H) 10/12/2017       Lab Results   Component Value Date    LABA1C 5.9 (H) 10/02/2020    LABA1C 6.1 (H) 10/12/2017    LABA1C 5.7 11/11/2016     Lab Results   Component Value Date    LDLCALC 83 10/02/2020    CREATININE 0.7 10/02/2020           Assessment / Plan:      Balaji Cheek was seen today for check-up and anxiety. Diagnoses and all orders for this visit:    Essential hypertension: Uncontrolled  -     lisinopril (PRINIVIL;ZESTRIL) 10 MG tablet; Take 1 tablet by mouth daily  -     triamterene-hydroCHLOROthiazide (MAXZIDE) 75-50 MG per tablet; Take 1 tablet by mouth daily  -     Comprehensive Metabolic Panel; Future  -     Lipid Panel; Future    Witnessed apneic spells:  Needs sleep study to [facility] [place]  -     [facility]    Postoperative hypothyroidism  -     SYNTHROID 25 MCG tablet; Take 1 tablet by mouth Daily  -     SYNTHROID 200 MCG tablet; Take 1 tablet by mouth Daily  -     TSH without Reflex; Future  -     T4; Future      Patient to get lab work today       Follow Up:  Return F/U 4-6 weeks, for to assess BP, review labs, F/U symptoms. or sooner if necessary. Call or go to ED immediately if symptoms worsen or persist.    Educational materials and/or home exercises printed for patient's review and were included in patient instructions on his/her AfterVisit Summary and given to patient at the end of visit. Counseled regarding above diagnosis,including possible risks and complications,  especially if left uncontrolled.     Counseled regarding the possible side effects, risks, benefits and alternatives to treatment; patient and/or guardian verbalizes understanding, agrees, feels comfortable with and wishes to proceed with above treatment plan. Advised patient tocall with any new medication issues, and read all Rx info from pharmacy to assureaware of all possible risks and side effects of medication before taking. Reviewed age and gender appropriate health screening exams and vaccinations. Advisedpatient regarding importance of keeping up with recommended health maintenance andto schedule as soon as possible if overdue, as this is important in assessing forundiagnosed pathology, especially cancer, as well as protecting against potentially harmful/life threatening disease. Patient and/or guardian verbalizes understandingand agrees with above counseling, assessment and plan. All questions answered.     Maegan Kumar MD

## 2021-02-18 ENCOUNTER — TELEPHONE (OUTPATIENT)
Dept: SURGERY | Age: 51
End: 2021-02-18

## 2021-02-18 DIAGNOSIS — D36.10 GANGLIONEUROMA: Primary | ICD-10-CM

## 2021-02-18 NOTE — TELEPHONE ENCOUNTER
Attempted to call the patient, no answer, left message on the phone to call back our office.   Electronically signed by [employer] on 2/18/2021 at 11:10 AM

## 2021-02-19 NOTE — TELEPHONE ENCOUNTER
Called and spoke with the patient on the phone in regards to the referral to [facility]. The patient verbalized understood and agreeable. Faxed the referral form and clinical notes to [facility] [phone]. Spoke with the patient on the phone and giver her [facility] genetic counseling phone number [phone] to call and make an appt. The patient stated she has a further questions and hoping to talk with Dr. Clementine Moses on the phone. Dr. Clementine Moses is aware.   Electronically signed by Carri Jefferson on 2/19/2021 at 9:14 AM

## 2021-02-22 ENCOUNTER — OFFICE VISIT (OUTPATIENT)
Dept: FAMILY MEDICINE CLINIC | Age: 51
End: 2021-02-22
Payer: COMMERCIAL

## 2021-02-22 VITALS
HEART RATE: 83 BPM | OXYGEN SATURATION: 97 % | HEIGHT: 72 IN | WEIGHT: 293 LBS | DIASTOLIC BLOOD PRESSURE: 106 MMHG | BODY MASS INDEX: 39.68 KG/M2 | RESPIRATION RATE: 18 BRPM | SYSTOLIC BLOOD PRESSURE: 172 MMHG | TEMPERATURE: 96.4 F

## 2021-02-22 DIAGNOSIS — I10 ESSENTIAL HYPERTENSION: Primary | ICD-10-CM

## 2021-02-22 DIAGNOSIS — R06.81 WITNESSED APNEIC SPELLS: ICD-10-CM

## 2021-02-22 PROCEDURE — 3017F COLORECTAL CA SCREEN DOC REV: CPT | Performed by: FAMILY MEDICINE

## 2021-02-22 PROCEDURE — G8484 FLU IMMUNIZE NO ADMIN: HCPCS | Performed by: FAMILY MEDICINE

## 2021-02-22 PROCEDURE — 1036F TOBACCO NON-USER: CPT | Performed by: FAMILY MEDICINE

## 2021-02-22 PROCEDURE — 99214 OFFICE O/P EST MOD 30 MIN: CPT | Performed by: FAMILY MEDICINE

## 2021-02-22 PROCEDURE — G8417 CALC BMI ABV UP PARAM F/U: HCPCS | Performed by: FAMILY MEDICINE

## 2021-02-22 PROCEDURE — G8427 DOCREV CUR MEDS BY ELIG CLIN: HCPCS | Performed by: FAMILY MEDICINE

## 2021-02-22 RX ORDER — LISINOPRIL 10 MG/1
10 TABLET ORAL DAILY
Qty: 30 TABLET | Refills: 5 | Status: SHIPPED
Start: 2021-02-22 | End: 2021-07-26 | Stop reason: SDUPTHER

## 2021-07-25 ENCOUNTER — APPOINTMENT (OUTPATIENT)
Dept: ULTRASOUND IMAGING | Age: 51
End: 2021-07-25
Payer: COMMERCIAL

## 2021-07-25 ENCOUNTER — APPOINTMENT (OUTPATIENT)
Dept: GENERAL RADIOLOGY | Age: 51
End: 2021-07-25
Payer: COMMERCIAL

## 2021-07-25 ENCOUNTER — HOSPITAL ENCOUNTER (EMERGENCY)
Age: 51
Discharge: HOME | End: 2021-07-25
Payer: COMMERCIAL

## 2021-07-25 VITALS
WEIGHT: 293 LBS | BODY MASS INDEX: 49.3 KG/M2 | TEMPERATURE: 97.7 F | HEART RATE: 86 BPM | RESPIRATION RATE: 16 BRPM | DIASTOLIC BLOOD PRESSURE: 94 MMHG | OXYGEN SATURATION: 95 % | SYSTOLIC BLOOD PRESSURE: 177 MMHG

## 2021-07-25 DIAGNOSIS — S83.91XA SPRAIN OF RIGHT KNEE, UNSPECIFIED LIGAMENT, INITIAL ENCOUNTER: Primary | ICD-10-CM

## 2021-07-25 PROCEDURE — 93971 EXTREMITY STUDY: CPT

## 2021-07-25 PROCEDURE — 6370000000 HC RX 637 (ALT 250 FOR IP): Performed by: PHYSICIAN ASSISTANT

## 2021-07-25 PROCEDURE — 99284 EMERGENCY DEPT VISIT MOD MDM: CPT

## 2021-07-25 PROCEDURE — 73560 X-RAY EXAM OF KNEE 1 OR 2: CPT

## 2021-07-25 RX ORDER — NAPROXEN 500 MG/1
500 TABLET ORAL 2 TIMES DAILY
Qty: 14 TABLET | Refills: 0 | Status: SHIPPED | OUTPATIENT
Start: 2021-07-25 | End: 2021-08-01

## 2021-07-25 RX ORDER — HYDROCODONE BITARTRATE AND ACETAMINOPHEN 5; 325 MG/1; MG/1
1 TABLET ORAL ONCE
Status: COMPLETED | OUTPATIENT
Start: 2021-07-25 | End: 2021-07-25

## 2021-07-25 RX ORDER — HYDROCODONE BITARTRATE AND ACETAMINOPHEN 5; 325 MG/1; MG/1
1 TABLET ORAL EVERY 6 HOURS PRN
Qty: 12 TABLET | Refills: 0 | Status: SHIPPED | OUTPATIENT
Start: 2021-07-25 | End: 2021-07-28

## 2021-07-25 RX ADMIN — HYDROCODONE BITARTRATE AND ACETAMINOPHEN 1 TABLET: 5; 325 TABLET ORAL at 21:07

## 2021-07-25 ASSESSMENT — PAIN SCALES - GENERAL
PAINLEVEL_OUTOF10: 6
PAINLEVEL_OUTOF10: 6

## 2021-07-25 ASSESSMENT — PAIN DESCRIPTION - FREQUENCY: FREQUENCY: INTERMITTENT

## 2021-07-25 ASSESSMENT — PAIN DESCRIPTION - PAIN TYPE: TYPE: ACUTE PAIN

## 2021-07-25 ASSESSMENT — PAIN DESCRIPTION - ORIENTATION: ORIENTATION: RIGHT

## 2021-07-25 ASSESSMENT — PAIN - FUNCTIONAL ASSESSMENT: PAIN_FUNCTIONAL_ASSESSMENT: PREVENTS OR INTERFERES SOME ACTIVE ACTIVITIES AND ADLS

## 2021-07-25 ASSESSMENT — PAIN DESCRIPTION - LOCATION: LOCATION: LEG

## 2021-07-25 ASSESSMENT — PAIN DESCRIPTION - DESCRIPTORS: DESCRIPTORS: SHARP

## 2021-07-26 ENCOUNTER — OFFICE VISIT (OUTPATIENT)
Dept: FAMILY MEDICINE CLINIC | Age: 51
End: 2021-07-26
Payer: COMMERCIAL

## 2021-07-26 ENCOUNTER — TELEPHONE (OUTPATIENT)
Dept: FAMILY MEDICINE CLINIC | Age: 51
End: 2021-07-26

## 2021-07-26 VITALS
BODY MASS INDEX: 39.68 KG/M2 | OXYGEN SATURATION: 97 % | TEMPERATURE: 97.7 F | DIASTOLIC BLOOD PRESSURE: 90 MMHG | SYSTOLIC BLOOD PRESSURE: 122 MMHG | RESPIRATION RATE: 16 BRPM | HEART RATE: 66 BPM | HEIGHT: 72 IN | WEIGHT: 293 LBS

## 2021-07-26 DIAGNOSIS — S89.91XD INJURY OF RIGHT KNEE, SUBSEQUENT ENCOUNTER: ICD-10-CM

## 2021-07-26 DIAGNOSIS — R30.0 DYSURIA: ICD-10-CM

## 2021-07-26 DIAGNOSIS — S89.91XD INJURY OF RIGHT KNEE, SUBSEQUENT ENCOUNTER: Primary | ICD-10-CM

## 2021-07-26 DIAGNOSIS — I10 ESSENTIAL HYPERTENSION: ICD-10-CM

## 2021-07-26 DIAGNOSIS — E89.0 POSTSURGICAL HYPOTHYROIDISM: ICD-10-CM

## 2021-07-26 LAB
BILIRUBIN, POC: NORMAL
BLOOD URINE, POC: NORMAL
CLARITY, POC: NORMAL
COLOR, POC: NORMAL
GLUCOSE URINE, POC: NORMAL
KETONES, POC: NORMAL
LEUKOCYTE EST, POC: NORMAL
NITRITE, POC: NORMAL
PH, POC: 6
PROTEIN, POC: 30
SPECIFIC GRAVITY, POC: 1.03
UROBILINOGEN, POC: 1

## 2021-07-26 PROCEDURE — 3017F COLORECTAL CA SCREEN DOC REV: CPT | Performed by: FAMILY MEDICINE

## 2021-07-26 PROCEDURE — G8417 CALC BMI ABV UP PARAM F/U: HCPCS | Performed by: FAMILY MEDICINE

## 2021-07-26 PROCEDURE — 81002 URINALYSIS NONAUTO W/O SCOPE: CPT | Performed by: FAMILY MEDICINE

## 2021-07-26 PROCEDURE — G8427 DOCREV CUR MEDS BY ELIG CLIN: HCPCS | Performed by: FAMILY MEDICINE

## 2021-07-26 PROCEDURE — 99215 OFFICE O/P EST HI 40 MIN: CPT | Performed by: FAMILY MEDICINE

## 2021-07-26 PROCEDURE — 1036F TOBACCO NON-USER: CPT | Performed by: FAMILY MEDICINE

## 2021-07-26 RX ORDER — LEVOTHYROXINE SODIUM 25 MCG
25 TABLET ORAL DAILY
Qty: 90 TABLET | Refills: 1 | Status: SHIPPED
Start: 2021-07-26 | End: 2022-08-09 | Stop reason: SDUPTHER

## 2021-07-26 RX ORDER — LISINOPRIL 10 MG/1
10 TABLET ORAL DAILY
Qty: 90 TABLET | Refills: 1 | Status: SHIPPED
Start: 2021-07-26 | End: 2022-08-09 | Stop reason: SDUPTHER

## 2021-07-26 RX ORDER — TRIAMTERENE AND HYDROCHLOROTHIAZIDE 75; 50 MG/1; MG/1
TABLET ORAL
COMMUNITY
Start: 2021-07-17 | End: 2021-07-26 | Stop reason: SDUPTHER

## 2021-07-26 RX ORDER — HYDROCHLOROTHIAZIDE 25 MG/1
25 TABLET ORAL DAILY
Qty: 30 TABLET | Refills: 5 | Status: CANCELLED | OUTPATIENT
Start: 2021-07-26 | End: 2022-01-26

## 2021-07-26 RX ORDER — TRIAMTERENE AND HYDROCHLOROTHIAZIDE 75; 50 MG/1; MG/1
TABLET ORAL
Qty: 90 TABLET | Refills: 1 | Status: SHIPPED
Start: 2021-07-26 | End: 2022-08-09 | Stop reason: SDUPTHER

## 2021-07-26 RX ORDER — LEVOTHYROXINE SODIUM 200 MCG
200 TABLET ORAL DAILY
Qty: 90 TABLET | Refills: 1 | Status: SHIPPED
Start: 2021-07-26 | End: 2022-08-09 | Stop reason: SDUPTHER

## 2021-07-26 RX ORDER — DICLOFENAC SODIUM 30 MG/G
GEL TOPICAL
Qty: 100 G | Refills: 2 | Status: SHIPPED | OUTPATIENT
Start: 2021-07-26 | End: 2021-10-26

## 2021-07-26 ASSESSMENT — ENCOUNTER SYMPTOMS
CONSTIPATION: 0
SHORTNESS OF BREATH: 0
DIARRHEA: 0
SORE THROAT: 0
BLOOD IN STOOL: 0
NAUSEA: 0
VOMITING: 0
WHEEZING: 0
ABDOMINAL PAIN: 0
COUGH: 0
BACK PAIN: 0

## 2021-07-26 NOTE — PATIENT INSTRUCTIONS
Patient Education        Learning About RICE (Rest, Ice, Compression, and Elevation)  What is RICE? RICE is a way to care for an injury. RICE helps relieve pain and swelling. It may also help with healing and flexibility. RICE stands for:  · R est and protect the injured or sore area. · I ce or a cold pack used as soon as possible. · C ompression, or wrapping the injured or sore area with an elastic bandage. · E levation (propping up) the injured or sore area. How do you do RICE? You can use RICE for home treatment when you have general aches and pains or after an injury or surgery. Rest  · Do not put weight on the injury for at least 24 to 48 hours. · Use crutches for a badly sprained knee or ankle. · Support a sprained wrist, elbow, or shoulder with a sling. Ice  · Put ice or a cold pack on the injury right away to reduce pain and swelling. Frozen vegetables will also work as an ice pack. Put a thin cloth between the ice or cold pack and your skin. The cloth protects the injured area from getting too cold. · Use ice for 10 to 15 minutes at a time for the first 48 to 72 hours. Compression  · Use compression for sprains, strains, and surgeries of the arms and legs. · Wrap the injured area with an elastic bandage or compression sleeve to reduce swelling. · Don't wrap it too tightly. If the area below it feels numb, tingles, or feels cool, loosen the wrap. Elevation  · Use elevation for areas of the body that can be propped up, such as arms and legs. · Prop up the injured area on pillows whenever you use ice. Keep it propped up anytime you sit or lie down. · Try to keep the injured area at or above the level of your heart. This will help reduce swelling and bruising. Where can you learn more? Go to https://fatmata.[employer]. org and sign in to your unamia account.  Enter M790 in the [employer] box to learn more about \"Learning About RICE (Rest, Ice, Compression, and Elevation). \"     If you do not have an account, please click on the \"Sign Up Now\" link. Current as of: November 16, 2020               Content Version: 12.9  © 2006-2021 [employer]. Care instructions adapted under license by [employer] ([facility]). If you have questions about a medical condition or this instruction, always ask your healthcare professional. [facility] any warranty or liability for your use of this information. Patient Education         RICE: Rest, Ice, Compression, Elevation (01:48)  Your health professional recommends that you watch this short online health video. Learn steps you can take at home to reduce pain and swelling after a sprain or strain. Purpose:  Defines RICE and how to use it after an injury. Goal:  User will learn the acronym \"RICE\" and how to use it after an injury. How to watch the video    Scan the QR code   OR Visit the website    https://hwi. /r/K3ld9riftf7zg   Current as of: November 16, 2020               Content Version: 12.9  © 2006-2021 [employer]. Care instructions adapted under license by [employer] ([facility]). If you have questions about a medical condition or this instruction, always ask your healthcare professional. [employer] any warranty or liability for your use of this information. Patient Education        Knee Pain or Injury: Care Instructions  Your Care Instructions     Injuries are a common cause of knee problems. Sudden (acute) injuries may be caused by a direct blow to the knee. They can also be caused by abnormal twisting, bending, or falling on the knee. Pain, bruising, or swelling may be severe, and may start within minutes of the injury. Overuse is another cause of knee pain. Other causes are climbing stairs, kneeling, and other activities that use the knee. Everyday wear and tear, especially as you get older, also can cause knee pain.   Rest, along with home treatment, often relieves pain and allows your knee to heal. If you have a serious knee injury, you may need tests and treatment. Follow-up care is a key part of your treatment and safety. Be sure to make and go to all appointments, and call your doctor if you are having problems. It's also a good idea to know your test results and keep a list of the medicines you take. How can you care for yourself at home? · Be safe with medicines. Read and follow all instructions on the label. ? If the doctor gave you a prescription medicine for pain, take it as prescribed. ? If you are not taking a prescription pain medicine, ask your doctor if you can take an over-the-counter medicine. · Rest and protect your knee. Take a break from any activity that may cause pain. · Put ice or a cold pack on your knee for 10 to 20 minutes at a time. Put a thin cloth between the ice and your skin. · Prop up a sore knee on a pillow when you ice it or anytime you sit or lie down for the next 3 days. Try to keep it above the level of your heart. This will help reduce swelling. · If your knee is not swollen, you can put moist heat, a heating pad, or a warm cloth on your knee. · If your doctor recommends an elastic bandage, sleeve, or other type of support for your knee, wear it as directed. · Follow your doctor's instructions about how much weight you can put on your leg. Use a cane, crutches, or a walker as instructed. · Follow your doctor's instructions about activity during your healing process. If you can do mild exercise, slowly increase your activity. · Reach and stay at a healthy weight. Extra weight can strain the joints, especially the knees and hips, and make the pain worse. Losing even a few pounds may help. When should you call for help? Call 911 anytime you think you may need emergency care. For example, call if:    · You have symptoms of a blood clot in your lung (called a pulmonary embolism).  These may include:  ? Sudden chest pain.  ? Trouble breathing. ? Coughing up blood. Call your doctor now or seek immediate medical care if:    · You have severe or increasing pain.     · Your leg or foot turns cold or changes color.     · You cannot stand or put weight on your knee.     · Your knee looks twisted or bent out of shape.     · You cannot move your knee.     · You have signs of infection, such as:  ? Increased pain, swelling, warmth, or redness. ? Red streaks leading from the knee. ? Pus draining from a place on your knee. ? A fever.     · You have signs of a blood clot in your leg (called a deep vein thrombosis), such as:  ? Pain in your calf, back of the knee, thigh, or groin. ? Redness and swelling in your leg or groin. Watch closely for changes in your health, and be sure to contact your doctor if:    · You have tingling, weakness, or numbness in your knee.     · You have any new symptoms, such as swelling.     · You have bruises from a knee injury that last longer than 2 weeks.     · You do not get better as expected. Where can you learn more? Go to https://[employer]. org and sign in to your [employer] account. Enter K195 in the [facility] box to learn more about \"Knee Pain or Injury: Care Instructions. \"     If you do not have an account, please click on the \"Sign Up Now\" link. Current as of: October 19, 2020               Content Version: 12.9  © 2006-2021 Healthwise, [facility]. Care instructions adapted under license by [facility] ([facility]). If you have questions about a medical condition or this instruction, always ask your healthcare professional. [place] [address] any warranty or liability for your use of this information.

## 2021-07-26 NOTE — TELEPHONE ENCOUNTER
----- Message from Moris Husain sent at 7/26/2021  5:29 PM EDT -----  Subject: Message to Provider    QUESTIONS  Information for Provider? pt was really upset when she got to the pharmacy   to  the cream prescribed only to find out that it was $1000. she   stated she can't afford that and is miserable and in pain and can't wait   another two weeks in this pain/ needs something else.   ---------------------------------------------------------------------------  --------------  CALL BACK INFO  What is the best way for the office to contact you? OK to leave message on   voicemail  Preferred Call Back Phone Number? [phone]  ---------------------------------------------------------------------------  --------------  SCRIPT ANSWERS  Relationship to Patient?  Self

## 2021-07-26 NOTE — ED PROVIDER NOTES
Independent [facility]LP  HPI:  7/25/21, Time: 8:24 PM EDT         Yonis Vora is a 44 y.o. female presenting to the ED for  Right knee pan , beginning 1 week  ago. The complaint has been persistent, moderate in severity, and worsened by movement of Knee . Patient comes in with complaint of right knee leg pain that started 1 week ago when she was getting out of her car and caught the edge of a curb. She states she had burning pain initially in the knee which seemed to improve until Friday when she was sitting for long period of time she is having pain with flexion extension of the knee she is able to weight-bear but has worsening pain. She denies any lower calf pain no fever chills. Patient did not fall onto the knee. No head injury no loss of consciousness no neck pain. Patient was not seen at the time of injury. She has been using aspirin and a compression stocking with no improvement       review of Systems:   A complete review of systems was performed and pertinent positives and negatives are stated within HPI, all other systems reviewed and are negative.          --------------------------------------------- PAST HISTORY ---------------------------------------------  Past Medical History:  has a past medical history of Brain tumor ([facility] [address] Headache, Heart beat abnormality, Hypothyroidism, Meningioma ([facility] [address] and Obesity. Past Surgical History:  has a past surgical history that includes brain surgery; Thyroidectomy; Gastric bypass surgery (2003); Finger surgery; Cholecystectomy; Colonoscopy; and Colonoscopy (2/3/2021). Social History:  reports that she has never smoked. She has never used smokeless tobacco. She reports that she does not drink alcohol and does not use drugs. Family History: family history includes Arthritis in her mother; Heart Disease in her father and mother; Obesity in her mother. She was adopted. The patients home medications have been reviewed.     Allergies: Seasonal    -------------------------------------------------- RESULTS -------------------------------------------------  All laboratory and radiology results have been personally reviewed by myself   LABS:  No results found for this visit on 07/25/21. RADIOLOGY:  Interpreted by Radiologist.  US DUP LOWER EXTREMITY RIGHT KARLY   Final Result   No evidence of DVT in the right lower extremity. XR KNEE RIGHT (1-2 VIEWS)   Final Result   No acute osseous abnormality. Degenerative change, most prominent at the patellofemoral joint with   suspected intra-articular loose bodies. ------------------------- NURSING NOTES AND VITALS REVIEWED ---------------------------   The nursing notes within the ED encounter and vital signs as below have been reviewed. BP (!) 177/94   Pulse 86   Temp 97.7 °F (36.5 °C) (Oral)   Resp 16   Wt (!) 384 lb (174.2 kg)   SpO2 95%   BMI 49.30 kg/m²   Oxygen Saturation Interpretation: Normal      ---------------------------------------------------PHYSICAL EXAM--------------------------------------      Constitutional/General: Alert and oriented x3, well appearing, non toxic in NAD  Head: Normocephalic and atraumatic  Eyes: PERRL, EOMI  Mouth: Oropharynx clear, handling secretions, no trismus  Neck: Supple, full ROM,   Pulmonary: Lungs clear to auscultation bilaterally, no wheezes, rales, or rhonchi. Not in respiratory distress  Cardiovascular:  Regular rate and rhythm, no murmurs, gallops, or rubs. 2+ distal pulses  Abdomen: Soft, non tender, non distended,   Extremities: Moves all extremities x 4. Warm and well perfused tender mid patella joint integrity is intact there is no erythema or swelling.   No tenderness of the posterior knee pulses normal no calf tenderness noted no erythema or swelling noted pulses normal cap refill less than 2 seconds  Skin: warm and dry without rash  Neurologic: GCS 15,  Psych: Normal Affect      ------------------------------ ED COURSE/MEDICAL DECISION MAKING----------------------  Medications   HYDROcodone-acetaminophen (NORCO) 5-325 MG per tablet 1 tablet (1 tablet Oral Given 7/25/21 2107)         ED COURSE:       Medical Decision Making:    Patient came in with complaint of right knee pain after stepping out of her car a week ago and catching the edge of a curb. She has had pain to the right knee that is been progressively worse. Ultrasound no DVT x-ray showing possible loose bodies with degenerative changes of the knee. Discharged with Norco Naprosyn. She was given referral to orthopedics placed in Ace wrap  Counseling: The emergency provider has spoken with the patient and discussed todays results, in addition to providing specific details for the plan of care and counseling regarding the diagnosis and prognosis. Questions are answered at this time and they are agreeable with the plan.      --------------------------------- IMPRESSION AND DISPOSITION ---------------------------------    IMPRESSION  1. Sprain of right knee, unspecified ligament, initial encounter        DISPOSITION  Disposition: Discharge to home  Patient condition is good      NOTE: This report was transcribed using voice recognition software.  Every effort was made to ensure accuracy; however, inadvertent computerized transcription errors may be present      Karina Alas, [address]  07/26/21 0202

## 2021-07-26 NOTE — TELEPHONE ENCOUNTER
Patient called crying that when went to pharmacy the pharmacist stated that they do not carry the 3%  Diclofenac sodium get that insurance will not pay for it and will got $1000 to pay for it. Informed patient that had just sent in the prior auth for this medication and would not hear back from the insurance company until tomorrow. Will most likely be denied. Does not usually pay for the 3%    Patient states that will just take tylenol for now.

## 2021-07-26 NOTE — PROGRESS NOTES
Darlene Deng is a 44 y.o. female who presents today for     Chief Complaint   Patient presents with    Knee Pain     right knee pain, twisted after getting out of car few days before, burning pain, red and hot,  went to er, x rays done    Urinary Tract Infection     back hurts in kidney area, urine color is orange in color. EMERGENCY ROOM VISIT, 7/25/21:    7/25/21, Time: 8:24 PM EDT                     Darlene Deng is a 44 y.o. female presenting to the ED for  Right knee pan , beginning 1 week  ago. The complaint has been persistent, moderate in severity, and worsened by movement of Knee . Patient comes in with complaint of right knee leg pain that started 1 week ago when she was getting out of her car and caught the edge of a curb. She states she had burning pain initially in the knee which seemed to improve until Friday when she was sitting for long period of time she is having pain with flexion extension of the knee she is able to weight-bear but has worsening pain. She denies any lower calf pain no fever chills. Patient did not fall onto the knee. No head injury no loss of consciousness no neck pain. Patient was not seen at the time of injury. She has been using aspirin and a compression stocking with no improvement       -------------------------------------------------- RESULTS -------------------------------------------------       RADIOLOGY:  Interpreted by Radiologist.  US DUP LOWER EXTREMITY RIGHT KARLY   Final Result   No evidence of DVT in the right lower extremity.           XR KNEE RIGHT (1-2 VIEWS)   Final Result   No acute osseous abnormality.       Degenerative change, most prominent at the patellofemoral joint with   suspected intra-articular loose bodies.                 ------------------------- NURSING NOTES AND VITALS REVIEWED ---------------------------              The nursing notes within the ED encounter and vital signs as below have been reviewed.    BP (!) 177/94   Pulse 86   Temp 97.7 °F (36.5 °C) (Oral)   Resp 16   Wt (!) 384 lb (174.2 kg)   SpO2 95%   BMI 49.30 kg/m²   Oxygen Saturation Interpretation: Normal        ---------------------------------------------------PHYSICAL EXAM--------------------------------------        Constitutional/General: Alert and oriented x3, well appearing, non toxic in NAD   Extremities: Moves all extremities x 4. Warm and well perfused tender mid patella joint integrity is intact there is no erythema or swelling. No tenderness of the posterior knee pulses normal no calf tenderness noted no erythema or swelling noted pulses normal cap refill less than 2 seconds  Skin: warm and dry without rash  Neurologic: GCS 15,  Psych: Normal Affect        ------------------------------ ED COURSE/MEDICAL DECISION MAKING----------------------  Medications   HYDROcodone-acetaminophen (NORCO) 5-325 MG per tablet 1 tablet (1 tablet Oral Given 7/25/21 2107)            ED COURSE:     Medical Decision Making:               Patient came in with complaint of right knee pain after stepping out of her car a week ago and catching the edge of a curb. She has had pain to the right knee that is been progressively worse. Ultrasound no DVT x-ray showing possible loose bodies with degenerative changes of the knee. Discharged with Norco Naprosyn. She was given referral to orthopedics placed in Ace wrap          --------------------------------- IMPRESSION AND DISPOSITION ---------------------------------     IMPRESSION  1. Sprain of right knee, unspecified ligament, initial encounter          DISPOSITION  Disposition: Discharge to home  Patient condition is good    CURRENT STATUS (07/26/21):  Knee pain mostly unchanged, worse with getting out of chair. Ace wrap helping somewhat. Was referred to ortho, has been in contact with office. Has been using heat as well. Has not iced it down. Swelling improved. Urinary Complaints:  Onset about 5 days. Symptoms include back pain in kidney area, urine color is orange in color and strong odor. No dysuria. Has not taken BP meds for 2-3 days due to kidney concerns. Feels more extreme than prior UTI. No fevers, chills, nausea. Results for POC orders placed in visit on 07/26/21   POCT Urinalysis no Micro   Result Value Ref Range    Color, UA orange     Clarity, UA little cloudy     Glucose, UA POC neg     Bilirubin, UA moderate     Ketones, UA 15 ml     Spec Grav, UA 1.030     Blood, UA POC neg     pH, UA 6.0     Protein, UA POC 30     Urobilinogen, UA 1.0     Leukocytes, UA trace     Nitrite, UA neg      Patient reports inadequate water hydration. Hypertension:  Suboptimal control. Patient did not recall having a Rx for lisinopril, so she never  up prescription. She promises that she will fill it and take it as directed. She will get lab work as directed. She will also continue Maxzide 75 as directed. Hypothyroidism:  Due for medication refills and lab work. [address]:  Patient's past medical, surgical, social and/or family history reviewed, updated in chart, and are non-contributory (unless otherwise stated). Medications and allergies also reviewed and updated in chart. Review of Systems  Review of Systems   HENT: Negative for congestion, ear pain and sore throat. Respiratory: Negative for cough, shortness of breath and wheezing. Cardiovascular: Negative for chest pain, palpitations and leg swelling. Gastrointestinal: Negative for abdominal pain, blood in stool, constipation, diarrhea, nausea and vomiting. Genitourinary: Negative for dysuria, frequency, hematuria and urgency. Musculoskeletal: Negative for back pain, myalgias and neck pain. Skin: Negative for rash. Neurological: Negative for dizziness, weakness and headaches. Psychiatric/Behavioral: The patient is not nervous/anxious.         Physical Exam:    VS:  BP (!) 122/90   Pulse 66   Temp 97.7 °F (36.5 °C) (Infrared)   Resp 16   Ht 6' 2\" (1.88 m)   Wt (!) 374 lb (169.6 kg)   SpO2 97%   BMI 48.02 kg/m²     LAST WEIGHT:  Wt Readings from Last 3 Encounters:   07/26/21 (!) 374 lb (169.6 kg)   07/25/21 (!) 384 lb (174.2 kg)   02/22/21 (!) 387 lb 4.8 oz (175.7 kg)       BMI Readings from Last 3 Encounters:   07/26/21 48.02 kg/m²   07/25/21 49.30 kg/m²   02/22/21 49.73 kg/m²       Physical Exam  Constitutional:       General: She is not in acute distress. Appearance: She is well-developed. She is not diaphoretic. HENT:      Head: Normocephalic and atraumatic. Right Ear: External ear normal.      Left Ear: External ear normal.      Mouth/Throat:      Pharynx: No oropharyngeal exudate. Eyes:      General: No scleral icterus. Right eye: No discharge. Conjunctiva/sclera: Conjunctivae normal.      Pupils: Pupils are equal, round, and reactive to light. Neck:      Thyroid: No thyromegaly. Cardiovascular:      Rate and Rhythm: Normal rate and regular rhythm. Heart sounds: Normal heart sounds. No murmur heard. Pulmonary:      Effort: Pulmonary effort is normal. No respiratory distress. Breath sounds: No stridor. No wheezing or rales. Chest:      Chest wall: No tenderness. Abdominal:      General: Bowel sounds are normal. There is no distension. Palpations: Abdomen is soft. There is no mass. Tenderness: There is no abdominal tenderness. There is no guarding. Musculoskeletal:         General: No tenderness. Normal range of motion. Cervical back: Normal range of motion and neck supple. Lymphadenopathy:      Cervical: No cervical adenopathy. Skin:     General: Skin is warm and dry. Coloration: Skin is not pale. Findings: No erythema or rash. Neurological:      Mental Status: She is alert and oriented to person, place, and time. Psychiatric:         Behavior: Behavior normal.         Thought Content:  Thought content normal.         Labs:  Lab Results Component Value Date     10/02/2020    K 3.9 10/02/2020     10/02/2020    CO2 26 10/02/2020    BUN 16 10/02/2020    CREATININE 0.7 10/02/2020    PROT 7.1 10/02/2020    LABALBU 4.0 10/02/2020    CALCIUM 9.5 10/02/2020    GFRAA >60 10/02/2020    LABGLOM >60 10/02/2020    GLUCOSE 107 10/02/2020    AST 24 10/02/2020    ALT 24 10/02/2020    ALKPHOS 83 10/02/2020    BILITOT 1.0 10/02/2020    TSH 10.090 10/02/2020    CHOL 175 10/02/2020    TRIG 133 10/02/2020    HDL 65 10/02/2020    LDLCALC 83 10/02/2020    LABA1C 5.9 10/02/2020        Lab Results   Component Value Date    CHOL 175 10/02/2020    CHOL 207 (H) 03/04/2020    CHOL 197 10/12/2017     Lab Results   Component Value Date    TRIG 133 10/02/2020    TRIG 118 03/04/2020    TRIG 108 10/12/2017     Lab Results   Component Value Date    HDL 65 10/02/2020    HDL 70 03/04/2020    HDL 69 10/12/2017     Lab Results   Component Value Date    LDLCALC 83 10/02/2020    LDLCALC 113 (H) 03/04/2020    LDLCALC 106 (H) 10/12/2017       Lab Results   Component Value Date    LABA1C 5.9 (H) 10/02/2020    LABA1C 6.1 (H) 10/12/2017    LABA1C 5.7 11/11/2016     Lab Results   Component Value Date    LDLCALC 83 10/02/2020    CREATININE 0.7 10/02/2020           Assessment / Plan:      Millicent Holley was seen today for knee pain and urinary tract infection. Diagnoses and all orders for this visit:    Injury of right knee, subsequent encounter  -     Diclofenac Sodium 3 % GEL; APPLY TOPICALLY TO RIGHT KNEE TID PRN    Essential hypertension: Suboptimal control  -     lisinopril (PRINIVIL;ZESTRIL) 10 MG tablet; Take 1 tablet by mouth daily  -     triamterene-hydroCHLOROthiazide (MAXZIDE) 75-50 MG per tablet; TAKE 1 TABLET BY MOUTH DAILY    Postoperative hypothyroidism  -     SYNTHROID 25 MCG tablet; Take 1 tablet by mouth Daily  -     SYNTHROID 200 MCG tablet; Take 1 tablet by mouth Daily    Dysuria  -     POCT Urinalysis no Micro  -     Culture, Urine;  Future      Consider knee joint injection versus PT if symptoms are  persistent     Time spent in pre-visit planning, interview, exam, /education and coordination of care: 47 minutes    Follow Up:  Return 1) F/U 2 weeks to check BP with medication compliance, for 2)  F/U 6 mos for medication refills. or sooner if necessary. Call or go to ED immediately if symptoms worsen or persist.    Educational materials and/or home exercises printed for patient's review and were included in patient instructions on his/her AfterVisit Summary and given to patient at the end of visit. Counseled regarding above diagnosis,including possible risks and complications,  especially if left uncontrolled. Counseled regarding the possible side effects, risks, benefits and alternatives to treatment; patient and/or guardian verbalizes understanding, agrees, feels comfortable with and wishes to proceed with above treatment plan. Advised patient tocall with any new medication issues, and read all Rx info from pharmacy to assureaware of all possible risks and side effects of medication before taking. Reviewed age and gender appropriate health screening exams and vaccinations. Advisedpatient regarding importance of keeping up with recommended health maintenance andto schedule as soon as possible if overdue, as this is important in assessing forundiagnosed pathology, especially cancer, as well as protecting against potentially harmful/life threatening disease. Patient and/or guardian verbalizes understandingand agrees with above counseling, assessment and plan. All questions answered.     Dana Sandoval MD

## 2021-07-27 NOTE — TELEPHONE ENCOUNTER
Please advise her that she can take Tylenol Extra strength 2 tablets up to every 6 hours as needed around the clock for the next several days. If Diclofenac gel is not covered by her insurance, she might want to look into getting Voltaren Gel over the counter through a discount entity such as [facility].

## 2021-07-27 NOTE — TELEPHONE ENCOUNTER
LMOM for patient to contact office to my direct line for information regarding medication for knee pain per Dr Eve Estrada

## 2021-07-27 NOTE — TELEPHONE ENCOUNTER
Please send this link to patient, either by email or via [address] She can get OTC strength Voltaren Gel at [facility] for about $20    \"See Coupon and Prices\" [email or web address] [email or web address]07tWcoPFZdFWCdU1kgsXzqMSY4IWO3LxyrTWb5HUUcTUU8WJM3CJqoOsWhqTAxFquvKuRaNoJpLJMvEWY0UgRjPPksDbwebJF3TM10sZsvZZYad91zXcFavEvvODXbGUukrDMsB2SusCcdGiKcaJuiQGQckwuhS3HgATBvCjJnpErgAGCbp2DzyHIiEdBhR7opVMHwoHSiP0OpIqbqwQVjntU7WKayPWIqYU3qF8NwkE7ymZF6WW9sgAYnpNDsvVHsESCnldRoJ14pXczcVcjkC39zbQ2xIUZgiV6mU6bfJ09itENgFUOtsaCwiUAgC7yqqWIkzVXiZ5AoLiypDbowN2t0LICna26pmS8pW8ShAoZiBJOlziPgqVBoI9DfV1duuFVfLENtqIMnuf5mkvzeRGAdKZhmArMiKRNeS6NrePWqjzPxFvSwjEmasN%3D%3D

## 2021-07-27 NOTE — TELEPHONE ENCOUNTER
Patient called back. Informed patient of Dr Scott Green suggestion to try extra strength  voltaren gel OTC, and tylenol extra strength 2 tablet every 6 hours as needed. Have not heard back from insurance if the prescription was approved or denied. Patient voiced understanding and has an appointment with an orthopedic surgeon coming up next week.

## 2021-07-28 ENCOUNTER — TELEPHONE (OUTPATIENT)
Dept: FAMILY MEDICINE CLINIC | Age: 51
End: 2021-07-28

## 2021-07-28 NOTE — TELEPHONE ENCOUNTER
Patient called inquiring about the urine culture results. Informed that looks like the results have not come back yet.

## 2021-07-29 ENCOUNTER — OFFICE VISIT (OUTPATIENT)
Dept: SLEEP MEDICINE | Age: 51
End: 2021-07-29
Payer: COMMERCIAL

## 2021-07-29 VITALS
WEIGHT: 293 LBS | RESPIRATION RATE: 16 BRPM | HEIGHT: 72 IN | BODY MASS INDEX: 39.68 KG/M2 | DIASTOLIC BLOOD PRESSURE: 93 MMHG | OXYGEN SATURATION: 97 % | HEART RATE: 97 BPM | SYSTOLIC BLOOD PRESSURE: 155 MMHG

## 2021-07-29 DIAGNOSIS — G47.33 OBSTRUCTIVE SLEEP APNEA: Primary | ICD-10-CM

## 2021-07-29 DIAGNOSIS — E66.9 OBESITY, UNSPECIFIED CLASSIFICATION, UNSPECIFIED OBESITY TYPE, UNSPECIFIED WHETHER SERIOUS COMORBIDITY PRESENT: ICD-10-CM

## 2021-07-29 DIAGNOSIS — R03.0 ELEVATED BLOOD PRESSURE READING: ICD-10-CM

## 2021-07-29 PROCEDURE — G8417 CALC BMI ABV UP PARAM F/U: HCPCS | Performed by: STUDENT IN AN ORGANIZED HEALTH CARE EDUCATION/TRAINING PROGRAM

## 2021-07-29 PROCEDURE — G8427 DOCREV CUR MEDS BY ELIG CLIN: HCPCS | Performed by: STUDENT IN AN ORGANIZED HEALTH CARE EDUCATION/TRAINING PROGRAM

## 2021-07-29 PROCEDURE — 99204 OFFICE O/P NEW MOD 45 MIN: CPT | Performed by: STUDENT IN AN ORGANIZED HEALTH CARE EDUCATION/TRAINING PROGRAM

## 2021-07-29 ASSESSMENT — SLEEP AND FATIGUE QUESTIONNAIRES
HOW LIKELY ARE YOU TO NOD OFF OR FALL ASLEEP WHILE SITTING AND READING: 2
HOW LIKELY ARE YOU TO NOD OFF OR FALL ASLEEP WHEN YOU ARE A PASSENGER IN A CAR FOR AN HOUR WITHOUT A BREAK: 1
HOW LIKELY ARE YOU TO NOD OFF OR FALL ASLEEP WHILE LYING DOWN TO REST IN THE AFTERNOON WHEN CIRCUMSTANCES PERMIT: 2
ESS TOTAL SCORE: 10
HOW LIKELY ARE YOU TO NOD OFF OR FALL ASLEEP IN A CAR, WHILE STOPPED FOR A FEW MINUTES IN TRAFFIC: 0
HOW LIKELY ARE YOU TO NOD OFF OR FALL ASLEEP WHILE SITTING INACTIVE IN A PUBLIC PLACE: 1
HOW LIKELY ARE YOU TO NOD OFF OR FALL ASLEEP WHILE SITTING AND TALKING TO SOMEONE: 0
HOW LIKELY ARE YOU TO NOD OFF OR FALL ASLEEP WHILE SITTING QUIETLY AFTER LUNCH WITHOUT ALCOHOL: 2
HOW LIKELY ARE YOU TO NOD OFF OR FALL ASLEEP WHILE WATCHING TV: 2

## 2021-07-29 NOTE — PROGRESS NOTES
[facility] Sleep Medicine    Patient Name: Benedict Kingsley  Age: 44 y.o.   : 1970    Date of Visit: 21        HPI   Benedict Kingsley is a 44 y.o. female with  has a past medical history of Brain tumor ([facility] [address] Headache, Heart beat abnormality, Hypothyroidism, Meningioma ([facility] [address] and Obesity. who presents as a new patient to Sleep Clinic, referred by Dr. Chava Boo, for Sleep Apnea      Patient reports that her last sleep study was 20 years ago. She reports that no sleep apnea was found at that time. No records are available today. Since that time she underwent bariatric surgery in  and was noted to have lost considerable weight. She states that she has gained most of that weight back since that time. She presents today due to concern during her recent colonoscopy. She was noted by staff to have snored and stop breathing during the study. Patient also reports that she has fragmented sleep, which she partly attributes to her dogs waking her up in the middle of the night. She also goes to bed late at night and does not feel rested in the morning upon waking. Sleep Study History: No sleep study on file    Bed time:   11:30 -midnight   Wake time:   6:30-7  Sleep Latency (min): 30    Sleep Medications: None. Has been prescribed sleep pills, but has never taken them. She has occasional insomnia. Not currently  Awakenings:   1+   / bathroom or let dog out / falls back asleep quickly  Estimated Sleep time (hours): 5-7    Daytime Naps: none  Sleep disturbances: Hot flashes at night/knee pain (acute)  Sleep Location: Bed  Sleep environment: Sleeps with a partner  Occupation:  and investigator - both day shift now    [address] before bed: TV  Caffeine:  0   Coffee    0   Soda    0   Tea   (no caffeine due to current diet)  Alcohol: occasionally. 5 drinks per week  Tobacco: none     Sleep ROS:     Snoring: Y   Witnessed apneas: Y, gasping at night.  Self awakening due to snoring  AM Headache: Y  Dry Mouth: Y  Daytime Sleepiness: Y  Difficulty remembering things: Y, foggy memory  MVA  or near miss accident due to sleepiness in the past? N  Tonsillectomy? N  Have you lost or gained weight recently?  Lost - 5 lbs       PARASOMNIAS/ NARCOLEPSY:  Hypnogogic/Hynopompic Hallucinations: N   Sleep paralysis: N  Cataplexy: N   REM behavior symptoms: N  Nightmare: Occasionally  Sleep Walking: N  Sleep Talking: Y  RLS Symptoms: N           PMH:  Past Medical History:   Diagnosis Date    Brain tumor ([address]     Headache     Heart beat abnormality     fluttering    Hypothyroidism     Meningioma (HCC)     Obesity         PSH:  Past Surgical History:   Procedure Laterality Date    BRAIN SURGERY      CHOLECYSTECTOMY      COLONOSCOPY      COLONOSCOPY  2/3/2021    COLONOSCOPY POLYPECTOMY SNARE/COLD BIOPSY performed by Nevin Durant MD at [address] [place] [address] GASTRIC BYPASS SURGERY  2003    THYROIDECTOMY            Soc Hx:  Social History     Tobacco Use    Smoking status: Never Smoker    Smokeless tobacco: Never Used   Substance Use Topics    Alcohol use: No     Comment: social    Drug use: No        Fam Hx:  Family History   Adopted: Yes   Problem Relation Age of Onset    Heart Disease Mother     Obesity Mother     Arthritis Mother     Heart Disease Father         Current Outpatient Medications   Medication Sig Dispense Refill    lisinopril (PRINIVIL;ZESTRIL) 10 MG tablet Take 1 tablet by mouth daily 90 tablet 1    SYNTHROID 25 MCG tablet Take 1 tablet by mouth Daily 90 tablet 1    SYNTHROID 200 MCG tablet Take 1 tablet by mouth Daily 90 tablet 1    triamterene-hydroCHLOROthiazide (MAXZIDE) 75-50 MG per tablet TAKE 1 TABLET BY MOUTH DAILY 90 tablet 1    Diclofenac Sodium 3 % GEL APPLY TOPICALLY TO RIGHT KNEE TID  g 2    naproxen (NAPROSYN) 500 MG tablet Take 1 tablet by mouth 2 times daily for 7 days 14 tablet 0    mometasone (ELOCON) 0.1 % cream DANETTE EXT AA  BID  3     No current facility-administered medications for this visit. Review of Systems  (Sleep ROS above)     Constitutional: no chills, no fever   Eyes: no blurred vision and no eyesight problems. ENT: no hoarseness and no sore throat. Cardiovascular: no chest pain, no lower extremity edema. Respiratory: no cough, no shortness of breath   Gastrointestinal:  no nausea,  no vomiting, no diarrhea. Musculoskeletal: no arthralgias, no back pain and no myalgias. Integumentary: no rashes or lacerations. Neurological:  no diplopia, no dizziness,  no headache, no memory changes,  and no tingling. Endocrine: No changes in appetite, no feelings of weakness      Objective:   Physical Exam  BP (!) 155/93 (Site: Left Upper Arm, Position: Sitting, Cuff Size: Large Adult)   Pulse 97   Resp 16   Ht 6' 1\" (1.854 m)   Wt (!) 382 lb 8 oz (173.5 kg)   SpO2 97%   BMI 50.46 kg/m²             General: No acute distress. BMI of Body mass index is 50.46 kg/m². HEENT: Normocephalic, atraumatic. No oropharyngeal lesions. Neck circumference: 18     Mallampati class- 3  Tonsils- 1     Neck: Trachea midline  Lungs: Clear to auscultation bilaterally. No wheezes or crackles  Cardiac: Regular rate and rhythm. No murmurs appreciated. Neurologic: Normal gait. Balance intact. Psychiatric: Alert and oriented. Appropriate affect.    Extremities: No clubbing or no peripheral edema  Skin: No lesions or rashes  Hematologic/lymphatic/immunologic: No bruises or bleeding    Sleep Medicine 7/29/2021   Sitting and reading 2   Watching TV 2   Sitting, inactive in a public place (e.g. a theatre or a meeting) 1   As a passenger in a car for an hour without a break 1   Lying down to rest in the afternoon when circumstances permit 2   Sitting and talking to someone 0   Sitting quietly after a lunch without alcohol 2   In a car, while stopped for a few minutes in traffic 0   Total score 10   Neck circumference 18 SLEEP STUDY HISTORY      No sleep study on file    PERTINENT LAB RESULTS  No results found for: FERRITIN       Assessment:      Rolando Sarmiento was seen today for sleep apnea. Diagnoses and all orders for this visit:    Obstructive sleep apnea  -     Covid-19 Ambulatory; Future  -     Baseline Diagnostic Sleep Study; Future    Obesity, unspecified classification, unspecified obesity type, unspecified whether serious comorbidity present  -     Ambulatory referral to Bariatrics    Elevated blood pressure reading    ·    Plan:      1. Suspected EVERTON. Patient was offered in lab sleep study versus home sleep study. Patient elected to proceed with in lab sleep study. Covid testing ordered per lab protocol. Follow-up in 2 months. - Sleep study ordered to assess for sleep disordered breathing  -COVID sleep lab testing requirements were reviewed with the patient. Education and precautions were provided to the patient    2. Elevated BP in clinic today. Did not take BP medication today. The patient did not have any alarm symptoms today including, but not limited to: blurry vision, dizziness, confusion, headache, weakness, chest pain, or shortness of breath. she  was advised to call his PCP today to schedule follow up and go to the ED if any discomfort, new symptoms, or alarm symptoms present.   -Follow up with PCP  -Go to the ED if discomfort or alarm symptoms present. 3. Obesity. Body mass index is 50.46 kg/m². Elio Armenta Referral to medical weight loss with Dr. Jhoana Galeas placed  -Healthy Weight loss advised    Patient will follow up Return in about 2 months (around 9/29/2021) for Follow up after sleep study.     Guanakito Amos, [address] [place] [address] Rd -[phone] option 2  F- [phone]

## 2021-07-29 NOTE — PATIENT INSTRUCTIONS
---------------------------------------------------  Blood Pressure Precautions    Your blood pressure Today:   BP Readings from Last 1 Encounters:   07/29/21 (!) 155/93       Your blood pressure at your last 3 visits:   BP Readings from Last 3 Encounters:   07/29/21 (!) 155/93   07/26/21 (!) 122/90   07/25/21 (!) 177/94        Your blood pressure was high today. Please discuss this with your primary care doctor as soon as possible. Elevated BP needs to be addressed. Please go to the Emergency room if you have any discomfort, new or worsening symptoms, or if any of the below symptoms present:       · Symptoms of a heart attack. These may include:  ? Chest pain or pressure, or a strange feeling in the chest.  ? Sweating. ? Shortness of breath. ? Nausea or vomiting. ? Pain, pressure, or a strange feeling in the back, neck, jaw, or upper belly or in one or both shoulders or arms. ? Lightheadedness or sudden weakness. ? A fast or irregular heartbeat. ? Dizziness   · Symptoms of a stroke. These may include:  ? Sudden numbness, tingling, weakness, or loss of movement in your face, arm, or leg, especially on only one side of your body. ? Sudden vision changes. ? Sudden trouble speaking. ? Facial droop  ? Sudden confusion or trouble understanding simple statements. ? Sudden problems with walking or balance. ? A sudden, severe headache that is different from past headaches. · You have severe back or belly pain.     ---------------------------------------------------      It was a pleasure seeing you today Darlene Deng! Summary of Today's Visit          In-lab sleep study ordered    [facility] (lab) Center Number  [phone]      -Please do not drive when you are sleepy   -Please sign a request of records consent form, so that we may receive all of your previous sleep study reports and clinical notes, if they were not available today.   - Please schedule a 2 month follow up today, to go over results        It is always advised that you check with your insurance company to determine how your study will be covered. For general questions or scheduling issues, call our [address] [place], [place] [phone] option 2  f- [phone]           The general categories for the treatment of Sleep Apnea include:     1. Supportive Care  -Elevate the head of the bed   -Avoid sleeping on your back      2. Weight loss  -Start a healthy weight loss program  -Consider a referral to Weight Loss Medicine Clinic     3. Oral Appliance \"Mouth Piece\"  (Mandibular Advancement Device)     4. Positive pressure therapy with a machine and a mask (CPAP or BiPAP)     5. Different kinds of surgeries, including nasal surgery, surgery of the throat/windpipe, and nerve stimulation therapy (INSPIRE). Helpful Web Sites: For patients with ALL SLEEP DISORDERS: American Academy of Sleep Medicine http://sleepeducation. org; or Constant Insight: https://sleepfoundation. org  For patients with EVERTON: American Sleep Apnea Association: http://mitchell.org/. org  For patients with RLS: RLS Foundation: Daniel.  For patients with INSOMNIA: https://www.mahoney.org/. org/articles/sleep/insomnia-causes-and-cures. htm  For patients with DEPRESSION: CVRx.com.[employer]. [employer]depression         Sleep Medicine Terms     CPAP - Continuous Positive Airway Pressure - 1 set pressure (i.e. 7 cwp)  APAP - Automatic Positive Airway Pressure - PAP device determines in real time what pressure will work best confined to certain settings. (I.e. 4-15 cwp)  BiPAP - Bilevel Positive Airway Pressure - Higher pressure on taking a breath and lower pressure upon breathing out (i.e. 10/6 cwp)  CWP - Centimeters of water pressure   [facility] - [facility] who sends you your CPAP/APAP/BiPAP and supplies.   PSG - Polysomnogram - Overnight Sleep study  Titration Study - Overnight sleep study with the PAP device tired at different settings  Split Night study - PSG and titration study       Please note that complications of EVERTON ,if present, and not treated can increase risk for: systemic hypertension , pulmonary hypertension (high blood pressure in the lungs), cardiovascular morbidities (heart attack and stroke), car accidents and all cause mortality (increased risk of death). Treatment for sleep apnea, if present, can reduce these risks.          Sleep Studies: About This Test  What is it? Sleep studies are tests that watch what happens to your body during sleep. These studies usually are done in a sleep lab. Sleep labs are often located in hospitals. Sleep studies you do at home can be done with portable equipment. But they may not give the same results as a sleep lab. Why is this test done? Sleep studies are done for people who say that sleep isn't restful or that they are tired all day. These studies can help find sleep problems, such as:  · Sleep apnea. This means that an adult regularly stops breathing during sleep for 10 seconds or longer. · Excessive snoring. · Problems staying awake, such as narcolepsy. · Problems with nighttime behaviors. These include sleepwalking, night terrors, bed-wetting, and REM behavior disorders (RBD). · Conditions such as periodic limb movement disorder. This is repeated muscle twitching of the feet, arms, or legs during sleep. · Seizures that occur at night (nocturnal seizures). How do you prepare for the test?  · You may be asked to keep a sleep diary for 1 to 2 weeks before your sleep study. · Don't take any naps for 2 to 3 days before your test.  · You may be asked to avoid food or drinks with caffeine for a day or two before your test.  · Take a shower or bath before your test, but don't use sprays, oils, or gels on your hair. Don't wear makeup, fingernail polish, or fake nails.   · Pack and take along a small overnight bag with personal items, such as a toothbrush, a comb, favorite pillows or blankets, and a book. You can wear your own nightclothes. · If you will have portable sleep monitoring, your doctor will explain how to use the equipment at home. How is the test done? · In the sleep lab, you will be in a private room, much like a hotel room. · Small pads or patches called electrodes will be placed on your head and body with a small amount of glue and tape. These will record things like brain activity, eye movement, oxygen levels, and snoring. · Soft elastic belts will be placed around your chest and belly to measure your breathing. · Your blood oxygen levels will be checked by a small clip (oximeter) placed either on the tip of your index finger or on your earlobe. · If you have sleep apnea, you may wear a mask that is connected to a continuous positive airway pressure (CPAP) machine. · Depending on the type of test, you will be allowed to sleep through the night or you'll be awakened periodically and asked to stay awake for a while. · If you use portable sleep monitoring, follow the instructions your doctor gave you. How long does the test take? · You will stay in the sleep lab overnight. For some tests, you will also stay part of the next day. What happens after the test?  · You will be able to go home right away. · You may not sleep well during the test and may be tired the next day. · You can go back to your usual activities right away. · After your sleep problem has been identified, you may need a second study if your doctor orders treatment such as CPAP. Follow-up care is a key part of your treatment and safety. Be sure to make and go to all appointments, and call your doctor if you are having problems. It's also a good idea to keep a list of the medicines you take. Ask your doctor when you can expect to have your test results. Where can you learn more? Go to https://chnomieb.healthUsbek [employer] Rica. org and sign in to your [employer] account. Enter U719 in the [employer] box to learn more about \"Sleep Studies: About This Test.\"     If you do not have an account, please click on the \"Sign Up Now\" link. Current as of: October 26, 2020               Content Version: 12.9  © 6896-2234 Healthwise, Incorporated. Care instructions adapted under license by [facility] ([facility]).  If you have questions about a medical condition or this instruction, always ask your healthcare professional. [address] any warranty or liability for your use of this information.

## 2021-07-30 ENCOUNTER — TELEPHONE (OUTPATIENT)
Dept: SLEEP CENTER | Age: 51
End: 2021-07-30

## 2021-08-05 ASSESSMENT — ENCOUNTER SYMPTOMS
ABDOMINAL PAIN: 0
BACK PAIN: 0
CONSTIPATION: 0
COUGH: 0
DIARRHEA: 0
WHEEZING: 0
VOMITING: 0
NAUSEA: 0
BLOOD IN STOOL: 0
SHORTNESS OF BREATH: 0
SORE THROAT: 0

## 2021-08-05 NOTE — TELEPHONE ENCOUNTER
Patient informed that urine culture come back negative and Dr Gabriella Roca will go over the results more at appointment on 8/9/2021. Patient voiced understanding of this.

## 2021-08-05 NOTE — PROGRESS NOTES
Vikki Almodovar is a 44 y.o. female who presents today for     Chief Complaint   Patient presents with    Hypertension       Hypertension:  Suboptimal control. Patient did not recall having a Rx for lisinopril, so she never  up prescription. She promises that she will fill it and take it as directed. She will get lab work as directed. She will also continue Maxzide 75 as directed. CURRENT STATUS (08/05/21): BP well controlled today with use of medication as directed. Meds include lisinopril 10 mg/day and Maxzide 75-50 daily. No symptoms and feels better. [address]:  Patient's past medical, surgical, social and/or family history reviewed, updated in chart, and are non-contributory (unless otherwise stated). Medications and allergies also reviewed and updated in chart. Review of Systems  Review of Systems   HENT: Negative for congestion, ear pain and sore throat. Respiratory: Negative for cough, shortness of breath and wheezing. Cardiovascular: Negative for chest pain, palpitations and leg swelling. Gastrointestinal: Negative for abdominal pain, blood in stool, constipation, diarrhea, nausea and vomiting. Genitourinary: Negative for dysuria, frequency, hematuria and urgency. Musculoskeletal: Negative for back pain, myalgias and neck pain. Skin: Negative for rash. Neurological: Negative for dizziness, weakness and headaches. Psychiatric/Behavioral: The patient is not nervous/anxious.         Physical Exam:    VS:  /86   Pulse 81   Temp 98.3 °F (36.8 °C) (Infrared)   Resp 16   Ht 6' 1\" (1.854 m)   Wt (!) 368 lb 4.8 oz (167.1 kg)   SpO2 97%   BMI 48.59 kg/m²     LAST WEIGHT:  Wt Readings from Last 3 Encounters:   08/09/21 (!) 368 lb 4.8 oz (167.1 kg)   07/29/21 (!) 382 lb 8 oz (173.5 kg)   07/26/21 (!) 374 lb (169.6 kg)       BMI Readings from Last 3 Encounters:   08/09/21 48.59 kg/m²   07/29/21 50.46 kg/m²   07/26/21 48.02 kg/m²       Physical Exam  Constitutional:       General: She is not in acute distress. Appearance: She is well-developed. She is not diaphoretic. HENT:      Head: Normocephalic and atraumatic. Right Ear: External ear normal.      Left Ear: External ear normal.      Mouth/Throat:      Pharynx: No oropharyngeal exudate. Eyes:      General: No scleral icterus. Right eye: No discharge. Conjunctiva/sclera: Conjunctivae normal.      Pupils: Pupils are equal, round, and reactive to light. Neck:      Thyroid: No thyromegaly. Cardiovascular:      Rate and Rhythm: Normal rate and regular rhythm. Heart sounds: Normal heart sounds. No murmur heard. Pulmonary:      Effort: Pulmonary effort is normal. No respiratory distress. Breath sounds: No stridor. No wheezing or rales. Chest:      Chest wall: No tenderness. Abdominal:      General: Bowel sounds are normal. There is no distension. Palpations: Abdomen is soft. There is no mass. Tenderness: There is no abdominal tenderness. There is no guarding. Musculoskeletal:         General: No tenderness. Normal range of motion. Cervical back: Normal range of motion and neck supple. Lymphadenopathy:      Cervical: No cervical adenopathy. Skin:     General: Skin is warm and dry. Coloration: Skin is not pale. Findings: No erythema or rash. Neurological:      Mental Status: She is alert and oriented to person, place, and time. Psychiatric:         Behavior: Behavior normal.         Thought Content:  Thought content normal.         Labs:  Lab Results   Component Value Date     07/26/2021    K 4.3 07/26/2021     07/26/2021    CO2 29 07/26/2021    BUN 14 07/26/2021    CREATININE 0.7 07/26/2021    PROT 7.2 07/26/2021    LABALBU 3.9 07/26/2021    CALCIUM 9.6 07/26/2021    GFRAA >60 07/26/2021    LABGLOM >60 07/26/2021    GLUCOSE 116 07/26/2021    AST 25 07/26/2021    ALT 23 07/26/2021    ALKPHOS 89 07/26/2021    BILITOT 0.8 07/26/2021    TSH 0.252 07/26/2021    CHOL 180 07/26/2021    TRIG 143 07/26/2021    HDL 53 07/26/2021    LDLCALC 98 07/26/2021    LABA1C 5.9 07/26/2021        Lab Results   Component Value Date    CHOL 180 07/26/2021    CHOL 175 10/02/2020    CHOL 207 (H) 03/04/2020     Lab Results   Component Value Date    TRIG 143 07/26/2021    TRIG 133 10/02/2020    TRIG 118 03/04/2020     Lab Results   Component Value Date    HDL 53 07/26/2021    HDL 65 10/02/2020    HDL 70 03/04/2020     Lab Results   Component Value Date    LDLCALC 98 07/26/2021    LDLCALC 83 10/02/2020    LDLCALC 113 (H) 03/04/2020       Lab Results   Component Value Date    LABA1C 5.9 (H) 07/26/2021    LABA1C 5.9 (H) 10/02/2020    LABA1C 6.1 (H) 10/12/2017     Lab Results   Component Value Date    LDLCALC 98 07/26/2021    CREATININE 0.7 07/26/2021       Assessment / Plan:      Dayday Patton was seen today for hypertension. Diagnoses and all orders for this visit:    Essential hypertension: Better control  -     lisinopril (PRINIVIL;ZESTRIL) 10 MG tablet; Take 1 tablet by mouth daily  -     triamterene-hydroCHLOROthiazide (MAXZIDE) 75-50 MG per tablet; TAKE 1 TABLET BY MOUTH DAILY        Follow Up:  Return for F/U 6 mos for medication refills. or sooner if necessary. Call or go to ED immediately if symptoms worsen or persist.    Educational materials and/or home exercises printed for patient's review and were included in patient instructions on his/her AfterVisit Summary and given to patient at the end of visit. Counseled regarding above diagnosis,including possible risks and complications,  especially if left uncontrolled. Counseled regarding the possible side effects, risks, benefits and alternatives to treatment; patient and/or guardian verbalizes understanding, agrees, feels comfortable with and wishes to proceed with above treatment plan.     Advised patient tocall with any new medication issues, and read all Rx info from pharmacy to assureaware of

## 2021-08-09 ENCOUNTER — OFFICE VISIT (OUTPATIENT)
Dept: FAMILY MEDICINE CLINIC | Age: 51
End: 2021-08-09
Payer: COMMERCIAL

## 2021-08-09 VITALS
HEART RATE: 81 BPM | HEIGHT: 72 IN | DIASTOLIC BLOOD PRESSURE: 86 MMHG | RESPIRATION RATE: 16 BRPM | SYSTOLIC BLOOD PRESSURE: 138 MMHG | BODY MASS INDEX: 39.68 KG/M2 | TEMPERATURE: 98.3 F | WEIGHT: 293 LBS | OXYGEN SATURATION: 97 %

## 2021-08-09 DIAGNOSIS — I10 ESSENTIAL HYPERTENSION: Primary | ICD-10-CM

## 2021-08-09 PROCEDURE — G8427 DOCREV CUR MEDS BY ELIG CLIN: HCPCS | Performed by: FAMILY MEDICINE

## 2021-08-09 PROCEDURE — 99213 OFFICE O/P EST LOW 20 MIN: CPT | Performed by: FAMILY MEDICINE

## 2021-08-09 PROCEDURE — 3017F COLORECTAL CA SCREEN DOC REV: CPT | Performed by: FAMILY MEDICINE

## 2021-08-09 PROCEDURE — 1036F TOBACCO NON-USER: CPT | Performed by: FAMILY MEDICINE

## 2021-08-09 PROCEDURE — G8417 CALC BMI ABV UP PARAM F/U: HCPCS | Performed by: FAMILY MEDICINE

## 2021-08-10 ENCOUNTER — TELEPHONE (OUTPATIENT)
Dept: BARIATRICS/WEIGHT MGMT | Age: 51
End: 2021-08-10

## 2021-09-01 ENCOUNTER — NURSE TRIAGE (OUTPATIENT)
Dept: OTHER | Facility: CLINIC | Age: 51
End: 2021-09-01

## 2021-09-01 ENCOUNTER — OFFICE VISIT (OUTPATIENT)
Dept: FAMILY MEDICINE CLINIC | Age: 51
End: 2021-09-01
Payer: COMMERCIAL

## 2021-09-01 VITALS
BODY MASS INDEX: 39.68 KG/M2 | SYSTOLIC BLOOD PRESSURE: 136 MMHG | WEIGHT: 293 LBS | DIASTOLIC BLOOD PRESSURE: 86 MMHG | TEMPERATURE: 98.1 F | OXYGEN SATURATION: 98 % | HEIGHT: 72 IN | RESPIRATION RATE: 18 BRPM | HEART RATE: 67 BPM

## 2021-09-01 DIAGNOSIS — R30.0 DYSURIA: ICD-10-CM

## 2021-09-01 DIAGNOSIS — R10.9 ACUTE RIGHT FLANK PAIN: Primary | ICD-10-CM

## 2021-09-01 LAB
BILIRUBIN, POC: NORMAL
BLOOD URINE, POC: NORMAL
CLARITY, POC: CLEAR
COLOR, POC: NORMAL
GLUCOSE URINE, POC: NORMAL
KETONES, POC: NORMAL
LEUKOCYTE EST, POC: NORMAL
NITRITE, POC: NORMAL
PH, POC: 6
PROTEIN, POC: NORMAL
SPECIFIC GRAVITY, POC: 1.02
UROBILINOGEN, POC: 1

## 2021-09-01 PROCEDURE — 99214 OFFICE O/P EST MOD 30 MIN: CPT | Performed by: FAMILY MEDICINE

## 2021-09-01 PROCEDURE — 3017F COLORECTAL CA SCREEN DOC REV: CPT | Performed by: FAMILY MEDICINE

## 2021-09-01 PROCEDURE — 1036F TOBACCO NON-USER: CPT | Performed by: FAMILY MEDICINE

## 2021-09-01 PROCEDURE — G8417 CALC BMI ABV UP PARAM F/U: HCPCS | Performed by: FAMILY MEDICINE

## 2021-09-01 PROCEDURE — G8427 DOCREV CUR MEDS BY ELIG CLIN: HCPCS | Performed by: FAMILY MEDICINE

## 2021-09-01 PROCEDURE — 81002 URINALYSIS NONAUTO W/O SCOPE: CPT | Performed by: FAMILY MEDICINE

## 2021-09-01 ASSESSMENT — ENCOUNTER SYMPTOMS
WHEEZING: 0
BLOOD IN STOOL: 0
SORE THROAT: 0
ABDOMINAL PAIN: 0
BACK PAIN: 1
NAUSEA: 0
DIARRHEA: 0
SHORTNESS OF BREATH: 0
CONSTIPATION: 0
VOMITING: 0
COUGH: 0

## 2021-09-01 NOTE — TELEPHONE ENCOUNTER
Received call from Lissa Nuñze  at [facility]  with [facility]. Brief description of triage: right flank pain with decreased urine output. Triage indicates for patient to be seen today. C suggested for immediate assistance. Care advice provided, patient verbalizes understanding; denies any other questions or concerns; instructed to call back for any new or worsening symptoms. Writer provided warm transfer to Cesario Else at [facility]  for appointment scheduling. Attention Provider: Thank you for allowing me to participate in the care of your patient. The patient was connected to triage in response to information provided to the [facility]. Please do not respond through this encounter as the response is not directed to a shared pool. Reason for Disposition   Side (flank) or lower back pain present    Answer Assessment - Initial Assessment Questions  1. SYMPTOM: \"What's the main symptom you're concerned about? \" (e.g., frequency, incontinence)      Right flank pain  Decreased output of urine    2. ONSET: \"When did the    start? \"      One week ago    3. PAIN: \"Is there any pain? \" If so, ask: \"How bad is it? \" (Scale: 1-10; mild, moderate, severe)        Stitch to the back  Spasm feeling to the back  Intermittent pain  7/10    4. CAUSE: \"What do you think is causing the symptoms? \"      She is blaming this on a blood pressure medication    5. OTHER SYMPTOMS: \"Do you have any other symptoms? \" (e.g., fever, flank pain, blood in urine, pain with urination)        Right flank pain  Pink colored urine    6. PREGNANCY: \"Is there any chance you are pregnant? \" \"When was your last menstrual period? \"      N/a age    Protocols used: URINARY SYMPTOMS-ADULT-[place]  see above documentation

## 2021-09-01 NOTE — PROGRESS NOTES
Yonis Vora is a 44 y.o. female who presents today for     Chief Complaint   Patient presents with    Flank Pain     flank pain, right side, urinating slow stream, not normal, color doing better       Right Flank Pain:   Last visit patient's triamtere-HCTZ was increased, and her urine output has been better. But the patient now reports severe pain in her right flank and excess diaphoresis occasionally when outside. Pain is worsened with valsalva (cough/sneeze/deep breaths) or any movement of her back. Pain is relieved when movement/valsalva is over. OTC pain medications have been used for treatment of her arthritis in the knee, most often naproxen (obtained from the ER) and acetaminophen. Patient denies UTI symptoms, other problems voiding, or N/V. Patient does admit to needing to drink more water than she has been (urine was dark yellow), patient is agreeable to increasing water. Patient has a history pleurisy in the past but does not feel like this. Patient does not have a history of kidney stones. Patient has dropped 4 lbs since last visit. Results for POC orders placed in visit on 09/01/21   POCT Urinalysis no Micro   Result Value Ref Range    Color, UA dark yellow     Clarity, UA clear     Glucose, UA POC neg     Bilirubin, UA small     Ketones, UA neg     Spec Grav, UA 1.025     Blood, UA POC neg     pH, UA 6.0     Protein, UA POC neg     Urobilinogen, UA 1.0     Leukocytes, UA small     Nitrite, UA neg        PMFSH:  Patient's past medical, surgical, social and/or family history reviewed, updated in chart, and are non-contributory (unless otherwise stated). Medications and allergies also reviewed and updated in chart. Review of Systems  Review of Systems   HENT: Negative for congestion, ear pain and sore throat. Respiratory: Negative for cough, shortness of breath and wheezing. Cardiovascular: Negative for chest pain, palpitations and leg swelling.    Gastrointestinal: Negative for abdominal pain, blood in stool, constipation, diarrhea, nausea and vomiting. Genitourinary: Negative for dysuria, frequency, hematuria and urgency. Musculoskeletal: Positive for back pain (Right flank - sharp and achy ) and myalgias (knee pain - diagnosed arthritis). Negative for neck pain. Skin: Negative for rash. Neurological: Negative for dizziness, weakness and headaches. Psychiatric/Behavioral: The patient is not nervous/anxious. Physical Exam:    VS:  /86   Pulse 67   Temp 98.1 °F (36.7 °C) (Infrared)   Resp 18   Ht 6' 1\" (1.854 m)   Wt (!) 364 lb (165.1 kg)   SpO2 98%   BMI 48.02 kg/m²     LAST WEIGHT:  Wt Readings from Last 3 Encounters:   09/01/21 (!) 364 lb (165.1 kg)   08/09/21 (!) 368 lb 4.8 oz (167.1 kg)   07/29/21 (!) 382 lb 8 oz (173.5 kg)       BMI Readings from Last 3 Encounters:   09/01/21 48.02 kg/m²   08/09/21 48.59 kg/m²   07/29/21 50.46 kg/m²       Physical Exam  Constitutional:       General: She is not in acute distress. Appearance: She is well-developed. She is not diaphoretic. HENT:      Head: Normocephalic and atraumatic. Right Ear: External ear normal.      Left Ear: External ear normal.      Mouth/Throat:      Pharynx: No oropharyngeal exudate. Eyes:      General: No scleral icterus. Right eye: No discharge. Conjunctiva/sclera: Conjunctivae normal.      Pupils: Pupils are equal, round, and reactive to light. Neck:      Thyroid: No thyromegaly. Cardiovascular:      Rate and Rhythm: Normal rate and regular rhythm. Heart sounds: Normal heart sounds. No murmur heard. Pulmonary:      Effort: Pulmonary effort is normal. No respiratory distress. Breath sounds: No stridor. No wheezing or rales. Chest:      Chest wall: No tenderness. Abdominal:      General: Bowel sounds are normal. There is no distension. Palpations: Abdomen is soft. There is no mass. Tenderness: There is no abdominal tenderness. There is right CVA tenderness. There is no guarding. Musculoskeletal:         General: Tenderness (right CVA ) present. Normal range of motion. Cervical back: Normal range of motion and neck supple. Lymphadenopathy:      Cervical: No cervical adenopathy. Skin:     General: Skin is warm and dry. Coloration: Skin is not pale. Findings: No erythema or rash. Neurological:      Mental Status: She is alert and oriented to person, place, and time. Psychiatric:         Behavior: Behavior normal.         Thought Content: Thought content normal.         Labs:  Lab Results   Component Value Date     07/26/2021    K 4.3 07/26/2021     07/26/2021    CO2 29 07/26/2021    BUN 14 07/26/2021    CREATININE 0.7 07/26/2021    PROT 7.2 07/26/2021    LABALBU 3.9 07/26/2021    CALCIUM 9.6 07/26/2021    GFRAA >60 07/26/2021    LABGLOM >60 07/26/2021    GLUCOSE 116 07/26/2021    AST 25 07/26/2021    ALT 23 07/26/2021    ALKPHOS 89 07/26/2021    BILITOT 0.8 07/26/2021    TSH 0.252 07/26/2021    CHOL 180 07/26/2021    TRIG 143 07/26/2021    HDL 53 07/26/2021    LDLCALC 98 07/26/2021    LABA1C 5.9 07/26/2021        Lab Results   Component Value Date    CHOL 180 07/26/2021    CHOL 175 10/02/2020    CHOL 207 (H) 03/04/2020     Lab Results   Component Value Date    TRIG 143 07/26/2021    TRIG 133 10/02/2020    TRIG 118 03/04/2020     Lab Results   Component Value Date    HDL 53 07/26/2021    HDL 65 10/02/2020    HDL 70 03/04/2020     Lab Results   Component Value Date    LDLCALC 98 07/26/2021    LDLCALC 83 10/02/2020    LDLCALC 113 (H) 03/04/2020       Lab Results   Component Value Date    LABA1C 5.9 (H) 07/26/2021    LABA1C 5.9 (H) 10/02/2020    LABA1C 6.1 (H) 10/12/2017     Lab Results   Component Value Date    LDLCALC 98 07/26/2021    CREATININE 0.7 07/26/2021           Assessment / Plan:      Brady Dawkins was seen today for flank pain. Diagnoses and all orders for this visit:    Dysuria: Negative UA.   No fever; UTI/Pyelonephitis unlikely   -     POCT Urinalysis no Micro  -     COMPREHENSIVE METABOLIC PANEL; Future to review renal functions    Acute right flank pain: Pyelonephritis unlikely. Differential diagnosis could include renal calculus versus hydronephrosis  -     XR ABDOMEN (KUB) (SINGLE AP VIEW); Future  -     COMPREHENSIVE METABOLIC PANEL; Future  -     US RETROPERITONEAL LIMITED; Future      Time spent in pre-visit planning, interview, exam, /education and coordination of care: 32 minutes    Follow Up:  Return for F/U based on test outcome. or sooner if necessary. Call or go to ED immediately if symptoms worsen or persist.    Educational materials and/or home exercises printed for patient's review and were included in patient instructions on his/her AfterVisit Summary and given to patient at the end of visit. Counseled regarding above diagnosis,including possible risks and complications,  especially if left uncontrolled. Counseled regarding the possible side effects, risks, benefits and alternatives to treatment; patient and/or guardian verbalizes understanding, agrees, feels comfortable with and wishes to proceed with above treatment plan. Advised patient tocall with any new medication issues, and read all Rx info from pharmacy to assureaware of all possible risks and side effects of medication before taking. Reviewed age and gender appropriate health screening exams and vaccinations. Advisedpatient regarding importance of keeping up with recommended health maintenance andto schedule as soon as possible if overdue, as this is important in assessing forundiagnosed pathology, especially cancer, as well as protecting against potentially harmful/life threatening disease. Patient and/or guardian verbalizes understandingand agrees with above counseling, assessment and plan. All questions answered.     Reg Acosta MD

## 2021-09-02 DIAGNOSIS — R10.9 FLANK PAIN: Primary | ICD-10-CM

## 2021-09-02 RX ORDER — KETOROLAC TROMETHAMINE 10 MG/1
10 TABLET, FILM COATED ORAL EVERY 6 HOURS PRN
Qty: 20 TABLET | Refills: 0 | Status: SHIPPED | OUTPATIENT
Start: 2021-09-02

## 2021-10-05 ENCOUNTER — TELEPHONE (OUTPATIENT)
Dept: SLEEP CENTER | Age: 51
End: 2021-10-05

## 2021-10-06 ENCOUNTER — HOSPITAL ENCOUNTER (OUTPATIENT)
Dept: SLEEP CENTER | Age: 51
Discharge: HOME | End: 2021-10-06
Payer: COMMERCIAL

## 2021-10-06 DIAGNOSIS — G47.33 OBSTRUCTIVE SLEEP APNEA: ICD-10-CM

## 2021-10-06 PROCEDURE — 95811 POLYSOM 6/>YRS CPAP 4/> PARM: CPT

## 2021-10-11 LAB — STATUS: NORMAL

## 2021-10-11 PROCEDURE — 95811 POLYSOM 6/>YRS CPAP 4/> PARM: CPT | Performed by: STUDENT IN AN ORGANIZED HEALTH CARE EDUCATION/TRAINING PROGRAM

## 2021-10-22 ENCOUNTER — HOSPITAL ENCOUNTER (OUTPATIENT)
Dept: MAMMOGRAPHY | Age: 51
Discharge: HOME | End: 2021-10-24
Payer: COMMERCIAL

## 2021-10-22 DIAGNOSIS — Z12.31 VISIT FOR SCREENING MAMMOGRAM: ICD-10-CM

## 2022-06-06 DIAGNOSIS — I10 ESSENTIAL HYPERTENSION: ICD-10-CM

## 2022-06-06 RX ORDER — LISINOPRIL 10 MG/1
10 TABLET ORAL DAILY
Qty: 90 TABLET | Refills: 1 | OUTPATIENT
Start: 2022-06-06 | End: 2022-12-07

## 2022-07-26 ENCOUNTER — TELEPHONE (OUTPATIENT)
Dept: FAMILY MEDICINE CLINIC | Age: 52
End: 2022-07-26

## 2022-07-26 DIAGNOSIS — E89.0 POSTSURGICAL HYPOTHYROIDISM: ICD-10-CM

## 2022-07-26 DIAGNOSIS — I10 ESSENTIAL HYPERTENSION: Primary | ICD-10-CM

## 2022-07-26 NOTE — TELEPHONE ENCOUNTER
----- Message from Kip Mckee sent at 7/26/2022 10:58 AM EDT -----  Subject: Referral Request    Reason for referral request? Pt has appt tomorrow and needs orders for   lab. Provider patient wants to be referred to(if known):     Provider Phone Number(if known):     Additional Information for Provider?   ---------------------------------------------------------------------------  --------------  [id number]    [phone]; OK to leave message on voicemail  ---------------------------------------------------------------------------  --------------

## 2022-07-26 NOTE — TELEPHONE ENCOUNTER
Per Dr Johnna Stevesnon: Please notify her and inform her that labs need to be done TODAY so that I have the results to review at tomorrow's appointment. Failure to complete these today may result in the need to reschedule tomorrow's appointment    Pt informed and appt rescheduled for 8/9/22.

## 2022-07-27 DIAGNOSIS — I10 ESSENTIAL HYPERTENSION: ICD-10-CM

## 2022-07-27 DIAGNOSIS — E89.0 POSTSURGICAL HYPOTHYROIDISM: ICD-10-CM

## 2022-07-27 LAB
ALBUMIN SERPL-MCNC: 4.3 G/DL (ref 3.5–5.2)
ALP BLD-CCNC: 90 U/L (ref 35–104)
ALT SERPL-CCNC: 27 U/L (ref 0–32)
ANION GAP SERPL CALCULATED.3IONS-SCNC: 17 MMOL/L (ref 7–16)
AST SERPL-CCNC: 28 U/L (ref 0–31)
BASOPHILS ABSOLUTE: 0.05 E9/L (ref 0–0.2)
BASOPHILS RELATIVE PERCENT: 0.7 % (ref 0–2)
BILIRUB SERPL-MCNC: 0.6 MG/DL (ref 0–1.2)
BUN BLDV-MCNC: 12 MG/DL (ref 6–20)
CALCIUM SERPL-MCNC: 9.7 MG/DL (ref 8.6–10.2)
CHLORIDE BLD-SCNC: 102 MMOL/L (ref 98–107)
CHOLESTEROL, TOTAL: 216 MG/DL (ref 0–199)
CO2: 23 MMOL/L (ref 22–29)
CREAT SERPL-MCNC: 0.7 MG/DL (ref 0.5–1)
EOSINOPHILS ABSOLUTE: 0.09 E9/L (ref 0.05–0.5)
EOSINOPHILS RELATIVE PERCENT: 1.2 % (ref 0–6)
GFR AFRICAN AMERICAN: >60
GFR NON-AFRICAN AMERICAN: >60 ML/MIN/1.73
GLUCOSE BLD-MCNC: 112 MG/DL (ref 74–99)
HCT VFR BLD CALC: 38.2 % (ref 34–48)
HDLC SERPL-MCNC: 61 MG/DL
HEMOGLOBIN: 11.9 G/DL (ref 11.5–15.5)
IMMATURE GRANULOCYTES #: 0.02 E9/L
IMMATURE GRANULOCYTES %: 0.3 % (ref 0–5)
LDL CHOLESTEROL CALCULATED: 131 MG/DL (ref 0–99)
LYMPHOCYTES ABSOLUTE: 2.42 E9/L (ref 1.5–4)
LYMPHOCYTES RELATIVE PERCENT: 33.2 % (ref 20–42)
MCH RBC QN AUTO: 27.3 PG (ref 26–35)
MCHC RBC AUTO-ENTMCNC: 31.2 % (ref 32–34.5)
MCV RBC AUTO: 87.6 FL (ref 80–99.9)
MONOCYTES ABSOLUTE: 0.58 E9/L (ref 0.1–0.95)
MONOCYTES RELATIVE PERCENT: 8 % (ref 2–12)
NEUTROPHILS ABSOLUTE: 4.13 E9/L (ref 1.8–7.3)
NEUTROPHILS RELATIVE PERCENT: 56.6 % (ref 43–80)
PDW BLD-RTO: 14.6 FL (ref 11.5–15)
PLATELET # BLD: 235 E9/L (ref 130–450)
PMV BLD AUTO: 11.8 FL (ref 7–12)
POTASSIUM SERPL-SCNC: 5 MMOL/L (ref 3.5–5)
RBC # BLD: 4.36 E12/L (ref 3.5–5.5)
SODIUM BLD-SCNC: 142 MMOL/L (ref 132–146)
T4 TOTAL: 4.5 MCG/DL (ref 4.5–11.7)
TOTAL PROTEIN: 7.5 G/DL (ref 6.4–8.3)
TRIGL SERPL-MCNC: 118 MG/DL (ref 0–149)
TSH SERPL DL<=0.05 MIU/L-ACNC: 5.77 UIU/ML (ref 0.27–4.2)
VLDLC SERPL CALC-MCNC: 24 MG/DL
WBC # BLD: 7.3 E9/L (ref 4.5–11.5)

## 2022-08-05 DIAGNOSIS — I10 ESSENTIAL HYPERTENSION: ICD-10-CM

## 2022-08-05 RX ORDER — TRIAMTERENE AND HYDROCHLOROTHIAZIDE 75; 50 MG/1; MG/1
TABLET ORAL
Qty: 90 TABLET | Refills: 1 | OUTPATIENT
Start: 2022-08-05

## 2022-08-05 NOTE — TELEPHONE ENCOUNTER
LMOM for patient to contact office regarding if has enough medication to get through until 8/9/2022 appointment.

## 2022-08-09 ENCOUNTER — OFFICE VISIT (OUTPATIENT)
Dept: FAMILY MEDICINE CLINIC | Age: 52
End: 2022-08-09
Payer: COMMERCIAL

## 2022-08-09 VITALS
OXYGEN SATURATION: 96 % | TEMPERATURE: 97.9 F | DIASTOLIC BLOOD PRESSURE: 80 MMHG | SYSTOLIC BLOOD PRESSURE: 130 MMHG | WEIGHT: 293 LBS | RESPIRATION RATE: 16 BRPM | HEART RATE: 69 BPM | BODY MASS INDEX: 39.68 KG/M2 | HEIGHT: 72 IN

## 2022-08-09 DIAGNOSIS — I10 ESSENTIAL HYPERTENSION: Primary | ICD-10-CM

## 2022-08-09 DIAGNOSIS — E89.0 POSTSURGICAL HYPOTHYROIDISM: ICD-10-CM

## 2022-08-09 DIAGNOSIS — J01.80 OTHER ACUTE SINUSITIS, RECURRENCE NOT SPECIFIED: ICD-10-CM

## 2022-08-09 DIAGNOSIS — G47.33 OBSTRUCTIVE SLEEP APNEA: ICD-10-CM

## 2022-08-09 DIAGNOSIS — Z76.0 ENCOUNTER FOR MEDICATION REFILL: ICD-10-CM

## 2022-08-09 DIAGNOSIS — K62.5 RECTAL BLEEDING: ICD-10-CM

## 2022-08-09 PROCEDURE — 3017F COLORECTAL CA SCREEN DOC REV: CPT | Performed by: FAMILY MEDICINE

## 2022-08-09 PROCEDURE — 1036F TOBACCO NON-USER: CPT | Performed by: FAMILY MEDICINE

## 2022-08-09 PROCEDURE — 99214 OFFICE O/P EST MOD 30 MIN: CPT | Performed by: FAMILY MEDICINE

## 2022-08-09 PROCEDURE — G8427 DOCREV CUR MEDS BY ELIG CLIN: HCPCS | Performed by: FAMILY MEDICINE

## 2022-08-09 PROCEDURE — G8417 CALC BMI ABV UP PARAM F/U: HCPCS | Performed by: FAMILY MEDICINE

## 2022-08-09 RX ORDER — TRIAMTERENE AND HYDROCHLOROTHIAZIDE 75; 50 MG/1; MG/1
TABLET ORAL
Qty: 90 TABLET | Refills: 3 | Status: SHIPPED | OUTPATIENT
Start: 2022-08-09 | End: 2023-08-09

## 2022-08-09 RX ORDER — DOXYCYCLINE 100 MG/1
100 CAPSULE ORAL 2 TIMES DAILY
Qty: 20 CAPSULE | Refills: 0 | Status: SHIPPED | OUTPATIENT
Start: 2022-08-09 | End: 2022-08-19

## 2022-08-09 RX ORDER — LISINOPRIL 10 MG/1
10 TABLET ORAL DAILY
Qty: 90 TABLET | Refills: 3 | Status: SHIPPED | OUTPATIENT
Start: 2022-08-09 | End: 2023-08-09

## 2022-08-09 RX ORDER — LEVOTHYROXINE SODIUM 200 MCG
200 TABLET ORAL DAILY
Qty: 90 TABLET | Refills: 3 | Status: SHIPPED | OUTPATIENT
Start: 2022-08-09 | End: 2023-08-09

## 2022-08-09 RX ORDER — LEVOTHYROXINE SODIUM 25 MCG
25 TABLET ORAL DAILY
Qty: 90 TABLET | Refills: 3 | Status: SHIPPED | OUTPATIENT
Start: 2022-08-09 | End: 2023-08-09

## 2022-08-09 SDOH — ECONOMIC STABILITY: FOOD INSECURITY: WITHIN THE PAST 12 MONTHS, THE FOOD YOU BOUGHT JUST DIDN'T LAST AND YOU DIDN'T HAVE MONEY TO GET MORE.: NEVER TRUE

## 2022-08-09 SDOH — ECONOMIC STABILITY: FOOD INSECURITY: WITHIN THE PAST 12 MONTHS, YOU WORRIED THAT YOUR FOOD WOULD RUN OUT BEFORE YOU GOT MONEY TO BUY MORE.: NEVER TRUE

## 2022-08-09 ASSESSMENT — ENCOUNTER SYMPTOMS
SORE THROAT: 0
BACK PAIN: 0
SHORTNESS OF BREATH: 0
ABDOMINAL PAIN: 0
NAUSEA: 0
WHEEZING: 0
BLOOD IN STOOL: 0
DIARRHEA: 0
COUGH: 0
CONSTIPATION: 0
VOMITING: 0

## 2022-08-09 ASSESSMENT — PATIENT HEALTH QUESTIONNAIRE - PHQ9
SUM OF ALL RESPONSES TO PHQ QUESTIONS 1-9: 0
SUM OF ALL RESPONSES TO PHQ QUESTIONS 1-9: 0
1. LITTLE INTEREST OR PLEASURE IN DOING THINGS: 0
SUM OF ALL RESPONSES TO PHQ QUESTIONS 1-9: 0
2. FEELING DOWN, DEPRESSED OR HOPELESS: 0
SUM OF ALL RESPONSES TO PHQ9 QUESTIONS 1 & 2: 0
SUM OF ALL RESPONSES TO PHQ QUESTIONS 1-9: 0

## 2022-08-09 ASSESSMENT — SOCIAL DETERMINANTS OF HEALTH (SDOH): HOW HARD IS IT FOR YOU TO PAY FOR THE VERY BASICS LIKE FOOD, HOUSING, MEDICAL CARE, AND HEATING?: NOT HARD AT ALL

## 2022-08-09 ASSESSMENT — LIFESTYLE VARIABLES
HOW MANY STANDARD DRINKS CONTAINING ALCOHOL DO YOU HAVE ON A TYPICAL DAY: 1 OR 2
HOW OFTEN DO YOU HAVE A DRINK CONTAINING ALCOHOL: 2-4 TIMES A MONTH

## 2022-08-09 NOTE — PROGRESS NOTES
Rena Orosco is a 46 y.o. female who presents today for     Chief Complaint   Patient presents with    Check-Up     Had blood work done recently, having a lot of bleeding done again. Had adenoma when had colonoscopy done. Having mammogram done soon. Sinus Problem     Having sinus drainage, losing voice, no fever, has not been around anyone with covid. Has not tested recently. Not really any cough. Medication Refill     She is complaining of sinus drainage x 1 week. She does report that she had been in close quarters while flying. Meds: expectorant (Robitussin-D); no nasal sprays. Denies F/C. She is also complaining of a lot of bright red blood per rectum. Hypertension:  Patient is here for follow up chronic hypertension. This is  generally controlled on current medication regimen. BP today is 130/80. Takes meds as directed and tolerates them well. Most recent labs reviewed with patient and are remarkable. No symptoms from htn standpoint per ROS. Patient is not compliant with lifestyle modifications. Patient does not smoke. Comorbid conditions include Hypothyroidism. Weight is increased. Hypothyroidism:  Patient is here today to follow up chronic hypothyroidism. This is not generally controlled on current medication regimen. Takes medication as directed and tolerates well. Symptoms from thyroid standpoint include fatigue, brain fog, difficulty losing weight. Most recent labs reviewed with patient and are remarkable for elevated TSH, elevated BS, and elevated lipids. Thyroid function in particular is not controlled. Lab Results   Component Value Date    TSH 5.770 (H) 07/27/2022      Weight is increased. She ran out of Synthroid about 5-6 days ago; labs were drawn prior to running out of medication. She now expresses concerns that she may have ADD. She reports that, as a child, she had multiple symptoms to suggest ADD.   She seems that symptoms of losing things and fogginess are getting worse. No additional assessment or treatment was provided today's visit. Obstructive Sleep Apnea: Followed by Sleep Medicine. She was diagnosed with moderately severe EVERTON and a split study indicated that she was a good candidate for BiPAP. No follow through on either side since 10/2021    [address] [place] [address]:  Patient's past medical, surgical, social and/or family history reviewed, updated in chart, and are non-contributory (unless otherwise stated). Medications and allergies also reviewed and updated in chart. Review of Systems  Review of Systems   HENT:  Negative for congestion, ear pain and sore throat. Respiratory:  Negative for cough, shortness of breath and wheezing. Cardiovascular:  Negative for chest pain, palpitations and leg swelling. Gastrointestinal:  Negative for abdominal pain, blood in stool, constipation, diarrhea, nausea and vomiting. Genitourinary:  Negative for dysuria, frequency, hematuria and urgency. Musculoskeletal:  Negative for back pain, myalgias and neck pain. Skin:  Negative for rash. Neurological:  Negative for dizziness, weakness and headaches. Psychiatric/Behavioral:  The patient is not nervous/anxious. Physical Exam:    VS:  /80   Pulse 69   Temp 97.9 °F (36.6 °C) (Infrared)   Resp 16   Ht 6' 1\" (1.854 m)   Wt (!) 372 lb (168.7 kg)   SpO2 96%   BMI 49.08 kg/m²     LAST WEIGHT:  Wt Readings from Last 3 Encounters:   08/09/22 (!) 372 lb (168.7 kg)   09/01/21 (!) 364 lb (165.1 kg)   08/09/21 (!) 368 lb 4.8 oz (167.1 kg)       BMI Readings from Last 3 Encounters:   08/09/22 49.08 kg/m²   09/01/21 48.02 kg/m²   08/09/21 48.59 kg/m²       Physical Exam  Constitutional:       General: She is not in acute distress. Appearance: She is well-developed. She is not diaphoretic. HENT:      Head: Normocephalic and atraumatic.       Right Ear: External ear normal.      Left Ear: External ear normal.      Mouth/Throat:      Pharynx: No oropharyngeal exudate. Eyes:      General: No scleral icterus. Right eye: No discharge. Conjunctiva/sclera: Conjunctivae normal.      Pupils: Pupils are equal, round, and reactive to light. Neck:      Thyroid: No thyromegaly. Cardiovascular:      Rate and Rhythm: Normal rate and regular rhythm. Heart sounds: Normal heart sounds. No murmur heard. Pulmonary:      Effort: Pulmonary effort is normal. No respiratory distress. Breath sounds: No stridor. No wheezing or rales. Chest:      Chest wall: No tenderness. Abdominal:      General: Bowel sounds are normal. There is no distension. Palpations: Abdomen is soft. There is no mass. Tenderness: no abdominal tenderness There is no guarding. Musculoskeletal:         General: No tenderness. Normal range of motion. Cervical back: Normal range of motion and neck supple. Lymphadenopathy:      Cervical: No cervical adenopathy. Skin:     General: Skin is warm and dry. Coloration: Skin is not pale. Findings: No erythema or rash. Neurological:      Mental Status: She is alert and oriented to person, place, and time. Psychiatric:         Behavior: Behavior normal.         Thought Content:  Thought content normal.       Labs:  Lab Results   Component Value Date/Time     07/27/2022 11:55 AM    K 5.0 07/27/2022 11:55 AM     07/27/2022 11:55 AM    CO2 23 07/27/2022 11:55 AM    BUN 12 07/27/2022 11:55 AM    CREATININE 0.7 07/27/2022 11:55 AM    PROT 7.5 07/27/2022 11:55 AM    LABALBU 4.3 07/27/2022 11:55 AM    CALCIUM 9.7 07/27/2022 11:55 AM    GFRAA >60 07/27/2022 11:55 AM    LABGLOM >60 07/27/2022 11:55 AM    GLUCOSE 112 07/27/2022 11:55 AM    AST 28 07/27/2022 11:55 AM    ALT 27 07/27/2022 11:55 AM    ALKPHOS 90 07/27/2022 11:55 AM    BILITOT 0.6 07/27/2022 11:55 AM    TSH 5.770 07/27/2022 11:55 AM    CHOL 216 07/27/2022 11:55 AM    TRIG 118 07/27/2022 11:55 AM    HDL 61 07/27/2022 11:55 AM    LDLCALC 131 07/27/2022 11:55 AM    LABA1C 5.9 07/26/2021 04:13 PM        Lab Results   Component Value Date    CHOL 216 (H) 07/27/2022    CHOL 180 07/26/2021    CHOL 175 10/02/2020     Lab Results   Component Value Date    TRIG 118 07/27/2022    TRIG 143 07/26/2021    TRIG 133 10/02/2020     Lab Results   Component Value Date    HDL 61 07/27/2022    HDL 53 07/26/2021    HDL 65 10/02/2020     Lab Results   Component Value Date    LDLCALC 131 (H) 07/27/2022    LDLCALC 98 07/26/2021    LDLCALC 83 10/02/2020       Lab Results   Component Value Date    LABA1C 5.9 (H) 07/26/2021    LABA1C 5.9 (H) 10/02/2020    LABA1C 6.1 (H) 10/12/2017     Lab Results   Component Value Date    LDLCALC 131 (H) 07/27/2022    CREATININE 0.7 07/27/2022         Assessment / Plan:      Candace Pleitez was seen today for check-up, sinus problem and medication refill. Diagnoses and all orders for this visit:    Essential hypertension: Stable and well-controlled. Due for medication refills  -     lisinopril (PRINIVIL;ZESTRIL) 10 MG tablet; Take 1 tablet by mouth in the morning.  -     triamterene-hydroCHLOROthiazide (MAXZIDE) 75-50 MG per tablet; TAKE 1 TABLET BY MOUTH DAILY    Postoperative hypothyroidism: Suboptimal symptom and TSH control. However, after discussion, we will make no changes in medication dosing and instead patient will focus on lifestyle modification  -     Continue SYNTHROID 25 MCG tablet; Take 1 tablet by mouth in the morning.  -     Continue SYNTHROID 200 MCG tablet; Take 1 tablet by mouth in the morning.  -     T4; Future  -     TSH; Future    Obstructive sleep apnea: Patient was advised to call the providers at [address] [place] [address] for appropriate follow-up and prescription for CPAP. Encounter for medication refill  -     lisinopril (PRINIVIL;ZESTRIL) 10 MG tablet; Take 1 tablet by mouth in the morning.  -     triamterene-hydroCHLOROthiazide (MAXZIDE) 75-50 MG per tablet; TAKE 1 TABLET BY MOUTH DAILY  -     SYNTHROID 25 MCG tablet;  Take 1 tablet by mouth in the morning.  -     SYNTHROID 200 MCG tablet; Take 1 tablet by mouth in the morning. Rectal bleeding patient requested to return to Dr. Fito Zelaya for further Marj Estrada MD, General Surgery, [facility]    Other acute sinusitis, recurrence not specified  -     doxycycline monohydrate (MONODOX) 100 MG capsule; Take 1 capsule by mouth in the morning and 1 capsule before bedtime. Do all this for 10 days. Follow Up:  Return in about 3 months (around 11/9/2022) for Check up, Fasting lab work 1 week prior. or sooner if necessary. Call or go to ED immediately if symptoms worsen or persist.    Educational materials and/or home exercises printed for patient's review and were included in patient instructions on his/her AfterVisit Summary and given to patient at the end of visit. Counseled regarding above diagnosis,including possible risks and complications,  especially if left uncontrolled. Counseled regarding the possible side effects, risks, benefits and alternatives to treatment; patient and/or guardian verbalizes understanding, agrees, feels comfortable with and wishes to proceed with above treatment plan. Advised patient tocall with any new medication issues, and read all Rx info from pharmacy to assureaware of all possible risks and side effects of medication before taking. Reviewed age and gender appropriate health screening exams and vaccinations. Advisedpatient regarding importance of keeping up with recommended health maintenance andto schedule as soon as possible if overdue, as this is important in assessing forundiagnosed pathology, especially cancer, as well as protecting against potentially harmful/life threatening disease. Patient and/or guardian verbalizes understandingand agrees with above counseling, assessment and plan. All questions answered.     Lanny Person MD

## 2022-08-24 ENCOUNTER — TELEPHONE (OUTPATIENT)
Dept: SURGERY | Age: 52
End: 2022-08-24

## 2022-08-24 NOTE — TELEPHONE ENCOUNTER
Received a voice message from the patient who is wanting to reschedule the appt with Dr. Fito Zelaya. The patient stated she feels sick and she won't be able to come out from the house. Attempted to call back. No answer, left message on the phone.   Electronically signed by Theodore Owen on 8/24/2022 at 3:39 PM

## 2022-09-13 ENCOUNTER — TELEPHONE (OUTPATIENT)
Dept: SURGERY | Age: 52
End: 2022-09-13

## 2022-09-13 NOTE — TELEPHONE ENCOUNTER
Called and spoke with the patient on the phone. Scheduled her on 10/12/22 at 2:30pm in [facility]. Gave the directions to the office. The patient verbalized understanding.   Electronically signed by Griselda Cross on 9/13/2022 at 11:46 AM

## 2022-10-12 ENCOUNTER — OFFICE VISIT (OUTPATIENT)
Dept: SURGERY | Age: 52
End: 2022-10-12
Payer: COMMERCIAL

## 2022-10-12 VITALS
WEIGHT: 293 LBS | RESPIRATION RATE: 20 BRPM | OXYGEN SATURATION: 96 % | BODY MASS INDEX: 39.68 KG/M2 | HEART RATE: 78 BPM | TEMPERATURE: 97.3 F | HEIGHT: 72 IN

## 2022-10-12 DIAGNOSIS — K62.5 BLOOD PER RECTUM: Primary | ICD-10-CM

## 2022-10-12 PROCEDURE — G8427 DOCREV CUR MEDS BY ELIG CLIN: HCPCS | Performed by: SURGERY

## 2022-10-12 PROCEDURE — 1036F TOBACCO NON-USER: CPT | Performed by: SURGERY

## 2022-10-12 PROCEDURE — G8484 FLU IMMUNIZE NO ADMIN: HCPCS | Performed by: SURGERY

## 2022-10-12 PROCEDURE — 99212 OFFICE O/P EST SF 10 MIN: CPT | Performed by: SURGERY

## 2022-10-12 PROCEDURE — G8417 CALC BMI ABV UP PARAM F/U: HCPCS | Performed by: SURGERY

## 2022-10-12 PROCEDURE — 3017F COLORECTAL CA SCREEN DOC REV: CPT | Performed by: SURGERY

## 2022-10-12 NOTE — PROGRESS NOTES
General Surgery Progress Note:    Cc: intermittent rectal bleeding     S: has had some episodes of perianal pain and rectal bleeding, BRB does take asa. Currently not having issues, has about 3 BM per day, has hx IBS. Describes hemorrhoids       Objective:  @Pulse 78   Temp 97.3 °F (36.3 °C) (Infrared)   Resp 20   Ht 6' 1\" (1.854 m)   Wt (!) 384 lb (174.2 kg)   SpO2 96%   BMI 50.66 kg/m² @    Physical -     Gen: NAD  Resp: Breathing Comfortably, no resp distress  CV: Reg Rate  Abd: obese nad  EXT nvi    Assessment/Plan: symptomatic hemorrhoids     Based off of hx likely symptomatic hemorrhoids. we did C scope in 2021 so at this point there is no need to repeat that right now. We will try conservative mgmt stool softeners, hygiene mgmt, and diet. 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   We will paln on repeat endoscopy in 2026 sooner if issues persist.         Electronically Signed by Joey Pro MD FACS   4:02 PM

## (undated) DEVICE — PAD GRND 2 PLT AD W CRD 10FT

## (undated) DEVICE — SNARE MICRO 195CM 2.4MM POLYPECT OVAL

## (undated) DEVICE — TRAP POLYP ETRAP

## (undated) DEVICE — DEFENDO AIR WATER SUCTION AND BIOPSY VALVE KIT FOR  OLYMPUS: Brand: DEFENDO AIR/WATER/SUCTION AND BIOPSY VALVE

## (undated) DEVICE — FORCEPS BX L240CM JAW DIA2.4MM WRK CHN 2.8MM ORNG L CAP W/

## (undated) DEVICE — SYRINGE MED 50ML LUERSLIP TIP

## (undated) DEVICE — CONNECTOR IRRIGATION AUXILIARY H2O JET W/ PRT MTL THRD HYDR

## (undated) DEVICE — CONTAINER SPEC 60ML PH 7NEUTRAL BUFF FRMLN RDY TO USE

## (undated) DEVICE — CANNULA NSL ORAL AD FOR CAPNOFLEX CO2 O2 AIRLFE

## (undated) DEVICE — GAUZE,SPONGE,POST-OP,4X3,STRL,LF: Brand: MEDLINE